# Patient Record
Sex: FEMALE | Race: WHITE | NOT HISPANIC OR LATINO | Employment: PART TIME | URBAN - METROPOLITAN AREA
[De-identification: names, ages, dates, MRNs, and addresses within clinical notes are randomized per-mention and may not be internally consistent; named-entity substitution may affect disease eponyms.]

---

## 2017-01-04 ENCOUNTER — COMMUNICATION - HEALTHEAST (OUTPATIENT)
Dept: RHEUMATOLOGY | Facility: CLINIC | Age: 62
End: 2017-01-04

## 2017-01-16 ENCOUNTER — RECORDS - HEALTHEAST (OUTPATIENT)
Dept: LAB | Facility: CLINIC | Age: 62
End: 2017-01-16

## 2017-01-16 LAB
CHOLEST SERPL-MCNC: 270 MG/DL
FASTING STATUS PATIENT QL REPORTED: YES
HDLC SERPL-MCNC: 80 MG/DL
LDLC SERPL CALC-MCNC: 167 MG/DL
TRIGL SERPL-MCNC: 117 MG/DL

## 2017-03-29 ENCOUNTER — OFFICE VISIT - HEALTHEAST (OUTPATIENT)
Dept: RHEUMATOLOGY | Facility: CLINIC | Age: 62
End: 2017-03-29

## 2017-03-29 DIAGNOSIS — M15.0 PRIMARY OSTEOARTHRITIS INVOLVING MULTIPLE JOINTS: ICD-10-CM

## 2017-03-29 DIAGNOSIS — M25.50 POLYARTHRALGIA: ICD-10-CM

## 2017-04-03 ENCOUNTER — COMMUNICATION - HEALTHEAST (OUTPATIENT)
Dept: ADMINISTRATIVE | Facility: CLINIC | Age: 62
End: 2017-04-03

## 2017-04-03 DIAGNOSIS — M19.90 OA (OSTEOARTHRITIS): ICD-10-CM

## 2017-04-03 DIAGNOSIS — M15.0 PRIMARY OSTEOARTHRITIS INVOLVING MULTIPLE JOINTS: ICD-10-CM

## 2017-04-03 DIAGNOSIS — M25.50 POLYARTHRALGIA: ICD-10-CM

## 2017-06-21 ENCOUNTER — COMMUNICATION - HEALTHEAST (OUTPATIENT)
Dept: RHEUMATOLOGY | Facility: CLINIC | Age: 62
End: 2017-06-21

## 2017-06-21 DIAGNOSIS — M25.50 POLYARTHRALGIA: ICD-10-CM

## 2017-06-21 DIAGNOSIS — M15.0 PRIMARY OSTEOARTHRITIS INVOLVING MULTIPLE JOINTS: ICD-10-CM

## 2017-06-27 ENCOUNTER — COMMUNICATION - HEALTHEAST (OUTPATIENT)
Dept: RHEUMATOLOGY | Facility: CLINIC | Age: 62
End: 2017-06-27

## 2017-06-27 DIAGNOSIS — M25.50 POLYARTHRALGIA: ICD-10-CM

## 2017-06-27 DIAGNOSIS — M15.0 PRIMARY OSTEOARTHRITIS INVOLVING MULTIPLE JOINTS: ICD-10-CM

## 2017-07-05 ENCOUNTER — COMMUNICATION - HEALTHEAST (OUTPATIENT)
Dept: RHEUMATOLOGY | Facility: CLINIC | Age: 62
End: 2017-07-05

## 2017-07-05 DIAGNOSIS — M15.0 PRIMARY OSTEOARTHRITIS INVOLVING MULTIPLE JOINTS: ICD-10-CM

## 2017-07-05 DIAGNOSIS — M25.50 POLYARTHRALGIA: ICD-10-CM

## 2017-07-31 ENCOUNTER — HOSPITAL ENCOUNTER (OUTPATIENT)
Dept: MAMMOGRAPHY | Facility: CLINIC | Age: 62
Discharge: HOME | End: 2017-07-31
Attending: FAMILY MEDICINE

## 2017-07-31 ENCOUNTER — COMMUNICATION - HEALTHEAST (OUTPATIENT)
Dept: TELEHEALTH | Facility: CLINIC | Age: 62
End: 2017-07-31

## 2017-07-31 DIAGNOSIS — Z12.31 VISIT FOR SCREENING MAMMOGRAM: ICD-10-CM

## 2017-09-19 ENCOUNTER — OFFICE VISIT - HEALTHEAST (OUTPATIENT)
Dept: RHEUMATOLOGY | Facility: CLINIC | Age: 62
End: 2017-09-19

## 2017-09-19 DIAGNOSIS — M77.8 LEFT SHOULDER TENDONITIS: ICD-10-CM

## 2017-09-19 DIAGNOSIS — M25.50 PAIN IN JOINT, MULTIPLE SITES: ICD-10-CM

## 2017-09-19 DIAGNOSIS — M15.0 PRIMARY OSTEOARTHRITIS INVOLVING MULTIPLE JOINTS: ICD-10-CM

## 2017-09-19 DIAGNOSIS — M25.512 CHRONIC LEFT SHOULDER PAIN: ICD-10-CM

## 2017-09-19 DIAGNOSIS — G89.29 CHRONIC LEFT SHOULDER PAIN: ICD-10-CM

## 2017-09-19 LAB
ALT SERPL W P-5'-P-CCNC: 22 U/L (ref 0–45)
CREAT SERPL-MCNC: 0.9 MG/DL (ref 0.6–1.1)
GFR SERPL CREATININE-BSD FRML MDRD: >60 ML/MIN/1.73M2

## 2017-12-12 ENCOUNTER — COMMUNICATION - HEALTHEAST (OUTPATIENT)
Dept: RHEUMATOLOGY | Facility: CLINIC | Age: 62
End: 2017-12-12

## 2017-12-12 DIAGNOSIS — M25.50 POLYARTHRALGIA: ICD-10-CM

## 2017-12-12 DIAGNOSIS — M15.0 PRIMARY OSTEOARTHRITIS INVOLVING MULTIPLE JOINTS: ICD-10-CM

## 2018-01-10 ENCOUNTER — COMMUNICATION - HEALTHEAST (OUTPATIENT)
Dept: RHEUMATOLOGY | Facility: CLINIC | Age: 63
End: 2018-01-10

## 2018-01-10 DIAGNOSIS — M15.0 PRIMARY OSTEOARTHRITIS INVOLVING MULTIPLE JOINTS: ICD-10-CM

## 2018-01-10 DIAGNOSIS — M25.50 PAIN IN JOINT, MULTIPLE SITES: ICD-10-CM

## 2018-01-17 ENCOUNTER — RECORDS - HEALTHEAST (OUTPATIENT)
Dept: LAB | Facility: CLINIC | Age: 63
End: 2018-01-17

## 2018-01-18 LAB
ANION GAP SERPL CALCULATED.3IONS-SCNC: 15 MMOL/L (ref 5–18)
BUN SERPL-MCNC: 31 MG/DL (ref 8–22)
CALCIUM SERPL-MCNC: 9.7 MG/DL (ref 8.5–10.5)
CHLORIDE BLD-SCNC: 103 MMOL/L (ref 98–107)
CHOLEST SERPL-MCNC: 246 MG/DL
CO2 SERPL-SCNC: 22 MMOL/L (ref 22–31)
CREAT SERPL-MCNC: 0.84 MG/DL (ref 0.6–1.1)
FASTING STATUS PATIENT QL REPORTED: ABNORMAL
GFR SERPL CREATININE-BSD FRML MDRD: >60 ML/MIN/1.73M2
GLUCOSE BLD-MCNC: 80 MG/DL (ref 70–125)
HDLC SERPL-MCNC: 83 MG/DL
LDLC SERPL CALC-MCNC: 146 MG/DL
POTASSIUM BLD-SCNC: 3.8 MMOL/L (ref 3.5–5)
SODIUM SERPL-SCNC: 140 MMOL/L (ref 136–145)
TRIGL SERPL-MCNC: 83 MG/DL

## 2018-01-19 LAB
HPV SOURCE: ABNORMAL
HUMAN PAPILLOMA VIRUS 16 DNA: NEGATIVE
HUMAN PAPILLOMA VIRUS 18 DNA: NEGATIVE
HUMAN PAPILLOMA VIRUS FINAL DIAGNOSIS: ABNORMAL
HUMAN PAPILLOMA VIRUS OTHER HR: POSITIVE
SPECIMEN DESCRIPTION: ABNORMAL

## 2018-01-24 LAB

## 2018-02-12 ENCOUNTER — COMMUNICATION - HEALTHEAST (OUTPATIENT)
Dept: RHEUMATOLOGY | Facility: CLINIC | Age: 63
End: 2018-02-12

## 2018-02-12 DIAGNOSIS — M25.50 POLYARTHRALGIA: ICD-10-CM

## 2018-02-12 DIAGNOSIS — M15.0 PRIMARY OSTEOARTHRITIS INVOLVING MULTIPLE JOINTS: ICD-10-CM

## 2018-02-19 ENCOUNTER — OFFICE VISIT - HEALTHEAST (OUTPATIENT)
Dept: RHEUMATOLOGY | Facility: CLINIC | Age: 63
End: 2018-02-19

## 2018-02-19 DIAGNOSIS — M79.7 FIBROMYALGIA: ICD-10-CM

## 2018-02-19 DIAGNOSIS — M15.0 PRIMARY OSTEOARTHRITIS INVOLVING MULTIPLE JOINTS: ICD-10-CM

## 2018-02-19 DIAGNOSIS — M15.0 PRIMARY GENERALIZED (OSTEO)ARTHRITIS: ICD-10-CM

## 2018-02-19 RX ORDER — DULOXETIN HYDROCHLORIDE 30 MG/1
30 CAPSULE, DELAYED RELEASE ORAL DAILY
Status: SHIPPED | COMMUNITY
Start: 2018-01-18

## 2018-02-19 ASSESSMENT — MIFFLIN-ST. JEOR: SCORE: 1586.88

## 2018-04-06 ENCOUNTER — COMMUNICATION - HEALTHEAST (OUTPATIENT)
Dept: RHEUMATOLOGY | Facility: CLINIC | Age: 63
End: 2018-04-06

## 2018-04-06 DIAGNOSIS — M15.0 PRIMARY OSTEOARTHRITIS INVOLVING MULTIPLE JOINTS: ICD-10-CM

## 2018-04-06 DIAGNOSIS — M25.50 POLYARTHRALGIA: ICD-10-CM

## 2018-04-11 ENCOUNTER — COMMUNICATION - HEALTHEAST (OUTPATIENT)
Dept: ADMINISTRATIVE | Facility: CLINIC | Age: 63
End: 2018-04-11

## 2018-04-18 ENCOUNTER — COMMUNICATION - HEALTHEAST (OUTPATIENT)
Dept: RHEUMATOLOGY | Facility: CLINIC | Age: 63
End: 2018-04-18

## 2018-04-18 DIAGNOSIS — M15.0 PRIMARY OSTEOARTHRITIS INVOLVING MULTIPLE JOINTS: ICD-10-CM

## 2018-04-18 DIAGNOSIS — M25.50 PAIN IN JOINT, MULTIPLE SITES: ICD-10-CM

## 2018-05-13 ENCOUNTER — COMMUNICATION - HEALTHEAST (OUTPATIENT)
Dept: RHEUMATOLOGY | Facility: CLINIC | Age: 63
End: 2018-05-13

## 2018-05-13 DIAGNOSIS — M25.50 POLYARTHRALGIA: ICD-10-CM

## 2018-05-13 DIAGNOSIS — M15.0 PRIMARY OSTEOARTHRITIS INVOLVING MULTIPLE JOINTS: ICD-10-CM

## 2018-06-07 ENCOUNTER — OFFICE VISIT - HEALTHEAST (OUTPATIENT)
Dept: RHEUMATOLOGY | Facility: CLINIC | Age: 63
End: 2018-06-07

## 2018-06-07 DIAGNOSIS — M79.7 FIBROMYALGIA: ICD-10-CM

## 2018-06-07 DIAGNOSIS — M25.50 PAIN IN JOINT, MULTIPLE SITES: ICD-10-CM

## 2018-07-05 ENCOUNTER — COMMUNICATION - HEALTHEAST (OUTPATIENT)
Dept: RHEUMATOLOGY | Facility: CLINIC | Age: 63
End: 2018-07-05

## 2018-07-05 DIAGNOSIS — M15.0 PRIMARY OSTEOARTHRITIS INVOLVING MULTIPLE JOINTS: ICD-10-CM

## 2018-07-05 DIAGNOSIS — M25.50 POLYARTHRALGIA: ICD-10-CM

## 2018-07-17 ENCOUNTER — COMMUNICATION - HEALTHEAST (OUTPATIENT)
Dept: ADMINISTRATIVE | Facility: CLINIC | Age: 63
End: 2018-07-17

## 2018-10-08 ENCOUNTER — OFFICE VISIT - HEALTHEAST (OUTPATIENT)
Dept: RHEUMATOLOGY | Facility: CLINIC | Age: 63
End: 2018-10-08

## 2018-10-08 DIAGNOSIS — M25.50 PAIN IN JOINT, MULTIPLE SITES: ICD-10-CM

## 2018-10-08 DIAGNOSIS — M25.50 POLYARTHRALGIA: ICD-10-CM

## 2018-10-08 DIAGNOSIS — M15.0 PRIMARY OSTEOARTHRITIS INVOLVING MULTIPLE JOINTS: ICD-10-CM

## 2018-10-08 ASSESSMENT — MIFFLIN-ST. JEOR: SCORE: 1593.24

## 2018-12-28 ENCOUNTER — COMMUNICATION - HEALTHEAST (OUTPATIENT)
Dept: RHEUMATOLOGY | Facility: CLINIC | Age: 63
End: 2018-12-28

## 2018-12-28 DIAGNOSIS — M25.50 POLYARTHRALGIA: ICD-10-CM

## 2018-12-28 DIAGNOSIS — M15.0 PRIMARY OSTEOARTHRITIS INVOLVING MULTIPLE JOINTS: ICD-10-CM

## 2019-01-21 ENCOUNTER — OFFICE VISIT - HEALTHEAST (OUTPATIENT)
Dept: RHEUMATOLOGY | Facility: CLINIC | Age: 64
End: 2019-01-21

## 2019-01-21 ENCOUNTER — RECORDS - HEALTHEAST (OUTPATIENT)
Dept: LAB | Facility: CLINIC | Age: 64
End: 2019-01-21

## 2019-01-21 DIAGNOSIS — M25.50 POLYARTHRALGIA: ICD-10-CM

## 2019-01-21 DIAGNOSIS — M15.0 PRIMARY OSTEOARTHRITIS INVOLVING MULTIPLE JOINTS: ICD-10-CM

## 2019-01-21 DIAGNOSIS — Z79.899 HIGH RISK MEDICATION USE: ICD-10-CM

## 2019-01-21 LAB
ALBUMIN SERPL-MCNC: 3.9 G/DL (ref 3.5–5)
ALP SERPL-CCNC: 55 U/L (ref 45–120)
ALT SERPL W P-5'-P-CCNC: 26 U/L (ref 0–45)
ANION GAP SERPL CALCULATED.3IONS-SCNC: 14 MMOL/L (ref 5–18)
AST SERPL W P-5'-P-CCNC: 30 U/L (ref 0–40)
BILIRUB SERPL-MCNC: 0.6 MG/DL (ref 0–1)
BUN SERPL-MCNC: 22 MG/DL (ref 8–22)
CALCIUM SERPL-MCNC: 9.9 MG/DL (ref 8.5–10.5)
CHLORIDE BLD-SCNC: 102 MMOL/L (ref 98–107)
CO2 SERPL-SCNC: 24 MMOL/L (ref 22–31)
CREAT SERPL-MCNC: 0.82 MG/DL (ref 0.6–1.1)
GFR SERPL CREATININE-BSD FRML MDRD: >60 ML/MIN/1.73M2
GLUCOSE BLD-MCNC: 84 MG/DL (ref 70–125)
POTASSIUM BLD-SCNC: 4 MMOL/L (ref 3.5–5)
PROT SERPL-MCNC: 7.1 G/DL (ref 6–8)
SODIUM SERPL-SCNC: 140 MMOL/L (ref 136–145)

## 2019-01-21 RX ORDER — TRAMADOL HYDROCHLORIDE 50 MG/1
TABLET ORAL
Qty: 180 TABLET | Refills: 0 | Status: SHIPPED | OUTPATIENT
Start: 2019-01-21

## 2019-01-22 LAB
HPV SOURCE: NORMAL
HUMAN PAPILLOMA VIRUS 16 DNA: NEGATIVE
HUMAN PAPILLOMA VIRUS 18 DNA: NEGATIVE
HUMAN PAPILLOMA VIRUS FINAL DIAGNOSIS: NORMAL
HUMAN PAPILLOMA VIRUS OTHER HR: NEGATIVE
SPECIMEN DESCRIPTION: NORMAL

## 2019-01-29 ENCOUNTER — COMMUNICATION - HEALTHEAST (OUTPATIENT)
Dept: RHEUMATOLOGY | Facility: CLINIC | Age: 64
End: 2019-01-29

## 2019-01-29 DIAGNOSIS — M15.0 PRIMARY OSTEOARTHRITIS INVOLVING MULTIPLE JOINTS: ICD-10-CM

## 2019-01-29 DIAGNOSIS — M25.50 PAIN IN JOINT, MULTIPLE SITES: ICD-10-CM

## 2019-01-29 LAB
BKR LAB AP ABNORMAL BLEEDING: NO
BKR LAB AP BIRTH CONTROL/HORMONES: NORMAL
BKR LAB AP CERVICAL APPEARANCE: NORMAL
BKR LAB AP GYN ADEQUACY: NORMAL
BKR LAB AP GYN INTERPRETATION: NORMAL
BKR LAB AP GYN OTHER FINDINGS: NORMAL
BKR LAB AP HPV REFLEX: NORMAL
BKR LAB AP LMP: 2005
BKR LAB AP PATIENT STATUS: NO
BKR LAB AP PREVIOUS ABNORMAL: NORMAL
BKR LAB AP PREVIOUS NORMAL: NORMAL
HIGH RISK?: YES
PATH REPORT.COMMENTS IMP SPEC: NORMAL
RESULT FLAG (HE HISTORICAL CONVERSION): NORMAL

## 2019-03-29 ENCOUNTER — COMMUNICATION - HEALTHEAST (OUTPATIENT)
Dept: RHEUMATOLOGY | Facility: CLINIC | Age: 64
End: 2019-03-29

## 2019-03-29 DIAGNOSIS — M15.0 PRIMARY OSTEOARTHRITIS INVOLVING MULTIPLE JOINTS: ICD-10-CM

## 2019-03-29 DIAGNOSIS — M25.50 POLYARTHRALGIA: ICD-10-CM

## 2019-04-23 ENCOUNTER — COMMUNICATION - HEALTHEAST (OUTPATIENT)
Dept: RHEUMATOLOGY | Facility: CLINIC | Age: 64
End: 2019-04-23

## 2019-04-23 DIAGNOSIS — M25.50 PAIN IN JOINT, MULTIPLE SITES: ICD-10-CM

## 2019-04-23 DIAGNOSIS — M15.0 PRIMARY OSTEOARTHRITIS INVOLVING MULTIPLE JOINTS: ICD-10-CM

## 2019-04-26 ENCOUNTER — COMMUNICATION - HEALTHEAST (OUTPATIENT)
Dept: RHEUMATOLOGY | Facility: CLINIC | Age: 64
End: 2019-04-26

## 2019-04-26 DIAGNOSIS — M15.0 PRIMARY OSTEOARTHRITIS INVOLVING MULTIPLE JOINTS: ICD-10-CM

## 2019-04-26 DIAGNOSIS — M25.50 POLYARTHRALGIA: ICD-10-CM

## 2019-05-16 ENCOUNTER — OFFICE VISIT - HEALTHEAST (OUTPATIENT)
Dept: RHEUMATOLOGY | Facility: CLINIC | Age: 64
End: 2019-05-16

## 2019-05-16 DIAGNOSIS — M79.7 FIBROMYALGIA: ICD-10-CM

## 2019-05-16 DIAGNOSIS — M77.8 LEFT SHOULDER TENDONITIS: ICD-10-CM

## 2019-05-16 DIAGNOSIS — M25.50 PAIN IN JOINT, MULTIPLE SITES: ICD-10-CM

## 2019-05-16 DIAGNOSIS — M15.0 PRIMARY OSTEOARTHRITIS INVOLVING MULTIPLE JOINTS: ICD-10-CM

## 2019-05-16 DIAGNOSIS — Z79.899 HIGH RISK MEDICATION USE: ICD-10-CM

## 2019-07-01 ENCOUNTER — COMMUNICATION - HEALTHEAST (OUTPATIENT)
Dept: RHEUMATOLOGY | Facility: CLINIC | Age: 64
End: 2019-07-01

## 2019-07-01 DIAGNOSIS — M15.0 PRIMARY OSTEOARTHRITIS INVOLVING MULTIPLE JOINTS: ICD-10-CM

## 2019-07-01 DIAGNOSIS — M25.50 POLYARTHRALGIA: ICD-10-CM

## 2019-07-03 ENCOUNTER — HOSPITAL ENCOUNTER (OUTPATIENT)
Dept: MAMMOGRAPHY | Facility: CLINIC | Age: 64
Discharge: HOME | End: 2019-07-03
Attending: FAMILY MEDICINE

## 2019-07-03 DIAGNOSIS — Z12.31 VISIT FOR SCREENING MAMMOGRAM: ICD-10-CM

## 2019-07-05 ENCOUNTER — COMMUNICATION - HEALTHEAST (OUTPATIENT)
Dept: ADMINISTRATIVE | Facility: CLINIC | Age: 64
End: 2019-07-05

## 2019-07-05 DIAGNOSIS — M25.50 POLYARTHRALGIA: ICD-10-CM

## 2019-07-05 DIAGNOSIS — M15.0 PRIMARY OSTEOARTHRITIS INVOLVING MULTIPLE JOINTS: ICD-10-CM

## 2019-07-23 ENCOUNTER — COMMUNICATION - HEALTHEAST (OUTPATIENT)
Dept: RHEUMATOLOGY | Facility: CLINIC | Age: 64
End: 2019-07-23

## 2019-07-23 DIAGNOSIS — M15.0 PRIMARY OSTEOARTHRITIS INVOLVING MULTIPLE JOINTS: ICD-10-CM

## 2019-07-23 DIAGNOSIS — M25.50 PAIN IN JOINT, MULTIPLE SITES: ICD-10-CM

## 2019-09-30 ENCOUNTER — COMMUNICATION - HEALTHEAST (OUTPATIENT)
Dept: RHEUMATOLOGY | Facility: CLINIC | Age: 64
End: 2019-09-30

## 2019-09-30 DIAGNOSIS — M15.0 PRIMARY OSTEOARTHRITIS INVOLVING MULTIPLE JOINTS: ICD-10-CM

## 2019-09-30 DIAGNOSIS — M25.50 POLYARTHRALGIA: ICD-10-CM

## 2019-10-20 ENCOUNTER — COMMUNICATION - HEALTHEAST (OUTPATIENT)
Dept: RHEUMATOLOGY | Facility: CLINIC | Age: 64
End: 2019-10-20

## 2019-10-20 DIAGNOSIS — M25.50 PAIN IN JOINT, MULTIPLE SITES: ICD-10-CM

## 2019-10-20 DIAGNOSIS — M15.0 PRIMARY OSTEOARTHRITIS INVOLVING MULTIPLE JOINTS: ICD-10-CM

## 2019-11-12 ENCOUNTER — COMMUNICATION - HEALTHEAST (OUTPATIENT)
Dept: RHEUMATOLOGY | Facility: CLINIC | Age: 64
End: 2019-11-12

## 2019-11-12 DIAGNOSIS — M25.50 PAIN IN JOINT, MULTIPLE SITES: ICD-10-CM

## 2019-11-12 DIAGNOSIS — M15.0 PRIMARY OSTEOARTHRITIS INVOLVING MULTIPLE JOINTS: ICD-10-CM

## 2019-11-13 ENCOUNTER — OFFICE VISIT - HEALTHEAST (OUTPATIENT)
Dept: RHEUMATOLOGY | Facility: CLINIC | Age: 64
End: 2019-11-13

## 2019-11-13 DIAGNOSIS — M75.82 OTHER SHOULDER LESIONS, LEFT SHOULDER: ICD-10-CM

## 2019-11-13 DIAGNOSIS — M15.0 PRIMARY OSTEOARTHRITIS INVOLVING MULTIPLE JOINTS: ICD-10-CM

## 2019-11-13 DIAGNOSIS — M79.7 FIBROMYALGIA: ICD-10-CM

## 2019-11-13 DIAGNOSIS — M77.8 LEFT SHOULDER TENDONITIS: ICD-10-CM

## 2019-11-13 DIAGNOSIS — G89.29 CHRONIC LEFT SHOULDER PAIN: ICD-10-CM

## 2019-11-13 DIAGNOSIS — M15.0 PRIMARY GENERALIZED (OSTEO)ARTHRITIS: ICD-10-CM

## 2019-11-13 DIAGNOSIS — M25.512 CHRONIC LEFT SHOULDER PAIN: ICD-10-CM

## 2019-11-13 ASSESSMENT — MIFFLIN-ST. JEOR: SCORE: 1555.13

## 2019-11-19 ENCOUNTER — COMMUNICATION - HEALTHEAST (OUTPATIENT)
Dept: RHEUMATOLOGY | Facility: CLINIC | Age: 64
End: 2019-11-19

## 2019-11-19 DIAGNOSIS — R91.1 SOLITARY LUNG NODULE: ICD-10-CM

## 2019-11-19 DIAGNOSIS — M25.50 PAIN IN JOINT, MULTIPLE SITES: ICD-10-CM

## 2019-11-19 DIAGNOSIS — M15.0 PRIMARY OSTEOARTHRITIS INVOLVING MULTIPLE JOINTS: ICD-10-CM

## 2019-11-19 DIAGNOSIS — R91.1 INCIDENTAL LUNG NODULE: ICD-10-CM

## 2019-11-21 ENCOUNTER — HOSPITAL ENCOUNTER (OUTPATIENT)
Dept: CT IMAGING | Facility: CLINIC | Age: 64
Discharge: HOME | End: 2019-11-21
Attending: INTERNAL MEDICINE

## 2019-11-21 ENCOUNTER — COMMUNICATION - HEALTHEAST (OUTPATIENT)
Dept: TELEHEALTH | Facility: CLINIC | Age: 64
End: 2019-11-21

## 2019-11-21 DIAGNOSIS — R91.1 INCIDENTAL LUNG NODULE: ICD-10-CM

## 2019-12-23 ENCOUNTER — COMMUNICATION - HEALTHEAST (OUTPATIENT)
Dept: RHEUMATOLOGY | Facility: CLINIC | Age: 64
End: 2019-12-23

## 2019-12-23 DIAGNOSIS — M25.50 POLYARTHRALGIA: ICD-10-CM

## 2019-12-23 DIAGNOSIS — M15.0 PRIMARY OSTEOARTHRITIS INVOLVING MULTIPLE JOINTS: ICD-10-CM

## 2020-01-16 ENCOUNTER — RECORDS - HEALTHEAST (OUTPATIENT)
Dept: LAB | Facility: CLINIC | Age: 65
End: 2020-01-16

## 2020-01-16 LAB
ANION GAP SERPL CALCULATED.3IONS-SCNC: 12 MMOL/L (ref 5–18)
BUN SERPL-MCNC: 35 MG/DL (ref 8–22)
CALCIUM SERPL-MCNC: 9.9 MG/DL (ref 8.5–10.5)
CHLORIDE BLD-SCNC: 104 MMOL/L (ref 98–107)
CO2 SERPL-SCNC: 26 MMOL/L (ref 22–31)
CREAT SERPL-MCNC: 1.09 MG/DL (ref 0.6–1.1)
GFR SERPL CREATININE-BSD FRML MDRD: 51 ML/MIN/1.73M2
GLUCOSE BLD-MCNC: 87 MG/DL (ref 70–125)
POTASSIUM BLD-SCNC: 4.5 MMOL/L (ref 3.5–5)
SODIUM SERPL-SCNC: 142 MMOL/L (ref 136–145)

## 2020-02-10 ENCOUNTER — RECORDS - HEALTHEAST (OUTPATIENT)
Dept: LAB | Facility: CLINIC | Age: 65
End: 2020-02-10

## 2020-02-10 LAB
ANION GAP SERPL CALCULATED.3IONS-SCNC: 12 MMOL/L (ref 5–18)
BUN SERPL-MCNC: 32 MG/DL (ref 8–22)
CALCIUM SERPL-MCNC: 10.3 MG/DL (ref 8.5–10.5)
CHLORIDE BLD-SCNC: 103 MMOL/L (ref 98–107)
CHOLEST SERPL-MCNC: 306 MG/DL
CO2 SERPL-SCNC: 25 MMOL/L (ref 22–31)
CREAT SERPL-MCNC: 0.96 MG/DL (ref 0.6–1.1)
FASTING STATUS PATIENT QL REPORTED: YES
GFR SERPL CREATININE-BSD FRML MDRD: 59 ML/MIN/1.73M2
GLUCOSE BLD-MCNC: 92 MG/DL (ref 70–125)
HDLC SERPL-MCNC: 92 MG/DL
LDLC SERPL CALC-MCNC: 200 MG/DL
POTASSIUM BLD-SCNC: 4.2 MMOL/L (ref 3.5–5)
SODIUM SERPL-SCNC: 140 MMOL/L (ref 136–145)
TRIGL SERPL-MCNC: 72 MG/DL

## 2020-02-21 ENCOUNTER — RECORDS - HEALTHEAST (OUTPATIENT)
Dept: LAB | Facility: CLINIC | Age: 65
End: 2020-02-21

## 2020-02-21 LAB
ALBUMIN SERPL-MCNC: 4.1 G/DL (ref 3.5–5)
ANION GAP SERPL CALCULATED.3IONS-SCNC: 12 MMOL/L (ref 5–18)
BUN SERPL-MCNC: 32 MG/DL (ref 8–22)
CALCIUM SERPL-MCNC: 10.2 MG/DL (ref 8.5–10.5)
CHLORIDE BLD-SCNC: 100 MMOL/L (ref 98–107)
CO2 SERPL-SCNC: 26 MMOL/L (ref 22–31)
CREAT SERPL-MCNC: 1.19 MG/DL (ref 0.6–1.1)
GFR SERPL CREATININE-BSD FRML MDRD: 46 ML/MIN/1.73M2
GLUCOSE BLD-MCNC: 83 MG/DL (ref 70–125)
PHOSPHATE SERPL-MCNC: 3.2 MG/DL (ref 2.5–4.5)
POTASSIUM BLD-SCNC: 4.5 MMOL/L (ref 3.5–5)
SODIUM SERPL-SCNC: 138 MMOL/L (ref 136–145)

## 2020-03-22 ENCOUNTER — COMMUNICATION - HEALTHEAST (OUTPATIENT)
Dept: RHEUMATOLOGY | Facility: CLINIC | Age: 65
End: 2020-03-22

## 2020-03-22 DIAGNOSIS — M15.0 PRIMARY OSTEOARTHRITIS INVOLVING MULTIPLE JOINTS: ICD-10-CM

## 2020-03-22 DIAGNOSIS — M25.50 POLYARTHRALGIA: ICD-10-CM

## 2020-04-06 ENCOUNTER — RECORDS - HEALTHEAST (OUTPATIENT)
Dept: LAB | Facility: CLINIC | Age: 65
End: 2020-04-06

## 2020-04-06 LAB
ANION GAP SERPL CALCULATED.3IONS-SCNC: 13 MMOL/L (ref 5–18)
BUN SERPL-MCNC: 20 MG/DL (ref 8–22)
CALCIUM SERPL-MCNC: 10.4 MG/DL (ref 8.5–10.5)
CHLORIDE BLD-SCNC: 101 MMOL/L (ref 98–107)
CO2 SERPL-SCNC: 24 MMOL/L (ref 22–31)
CREAT SERPL-MCNC: 1.17 MG/DL (ref 0.6–1.1)
GFR SERPL CREATININE-BSD FRML MDRD: 47 ML/MIN/1.73M2
GLUCOSE BLD-MCNC: 97 MG/DL (ref 70–125)
POTASSIUM BLD-SCNC: 4.6 MMOL/L (ref 3.5–5)
SODIUM SERPL-SCNC: 138 MMOL/L (ref 136–145)

## 2020-04-18 ENCOUNTER — COMMUNICATION - HEALTHEAST (OUTPATIENT)
Dept: RHEUMATOLOGY | Facility: CLINIC | Age: 65
End: 2020-04-18

## 2020-04-18 DIAGNOSIS — M25.50 POLYARTHRALGIA: ICD-10-CM

## 2020-04-18 DIAGNOSIS — M15.0 PRIMARY OSTEOARTHRITIS INVOLVING MULTIPLE JOINTS: ICD-10-CM

## 2020-05-12 ENCOUNTER — OFFICE VISIT - HEALTHEAST (OUTPATIENT)
Dept: RHEUMATOLOGY | Facility: CLINIC | Age: 65
End: 2020-05-12

## 2020-05-12 DIAGNOSIS — M25.50 PAIN IN JOINT, MULTIPLE SITES: ICD-10-CM

## 2020-05-12 DIAGNOSIS — M15.0 PRIMARY OSTEOARTHRITIS INVOLVING MULTIPLE JOINTS: ICD-10-CM

## 2020-05-12 DIAGNOSIS — M79.7 FIBROMYALGIA: ICD-10-CM

## 2020-05-12 DIAGNOSIS — Z79.899 HIGH RISK MEDICATION USE: ICD-10-CM

## 2020-09-10 ENCOUNTER — OFFICE VISIT - HEALTHEAST (OUTPATIENT)
Dept: RHEUMATOLOGY | Facility: CLINIC | Age: 65
End: 2020-09-10

## 2020-09-10 DIAGNOSIS — Z79.899 HIGH RISK MEDICATION USE: ICD-10-CM

## 2020-09-10 DIAGNOSIS — M25.50 PAIN IN JOINT, MULTIPLE SITES: ICD-10-CM

## 2020-09-10 DIAGNOSIS — M79.7 FIBROMYALGIA: ICD-10-CM

## 2020-09-10 DIAGNOSIS — M15.0 PRIMARY OSTEOARTHRITIS INVOLVING MULTIPLE JOINTS: ICD-10-CM

## 2020-10-07 ENCOUNTER — OFFICE VISIT - HEALTHEAST (OUTPATIENT)
Dept: RHEUMATOLOGY | Facility: CLINIC | Age: 65
End: 2020-10-07

## 2020-10-07 DIAGNOSIS — M15.0 PRIMARY OSTEOARTHRITIS INVOLVING MULTIPLE JOINTS: ICD-10-CM

## 2020-10-07 DIAGNOSIS — M77.8 LEFT SHOULDER TENDONITIS: ICD-10-CM

## 2020-10-07 RX ORDER — METRONIDAZOLE 7.5 MG/G
GEL TOPICAL
Status: SHIPPED | COMMUNITY
Start: 2020-08-10

## 2020-10-07 RX ORDER — CANDESARTAN 8 MG/1
TABLET ORAL
Status: SHIPPED | COMMUNITY
Start: 2020-10-05

## 2020-12-03 ENCOUNTER — RECORDS - HEALTHEAST (OUTPATIENT)
Dept: LAB | Facility: CLINIC | Age: 65
End: 2020-12-03

## 2020-12-03 LAB
ANION GAP SERPL CALCULATED.3IONS-SCNC: 14 MMOL/L (ref 5–18)
BUN SERPL-MCNC: 22 MG/DL (ref 8–22)
CALCIUM SERPL-MCNC: 10 MG/DL (ref 8.5–10.5)
CHLORIDE BLD-SCNC: 101 MMOL/L (ref 98–107)
CO2 SERPL-SCNC: 27 MMOL/L (ref 22–31)
CREAT SERPL-MCNC: 0.97 MG/DL (ref 0.6–1.1)
GFR SERPL CREATININE-BSD FRML MDRD: 58 ML/MIN/1.73M2
GLUCOSE BLD-MCNC: 100 MG/DL (ref 70–125)
POTASSIUM BLD-SCNC: 4.5 MMOL/L (ref 3.5–5)
SODIUM SERPL-SCNC: 142 MMOL/L (ref 136–145)

## 2020-12-16 ENCOUNTER — HOSPITAL ENCOUNTER (OUTPATIENT)
Dept: MAMMOGRAPHY | Facility: CLINIC | Age: 65
Discharge: HOME | End: 2020-12-16
Attending: FAMILY MEDICINE

## 2020-12-16 DIAGNOSIS — Z12.31 VISIT FOR SCREENING MAMMOGRAM: ICD-10-CM

## 2021-02-15 ENCOUNTER — RECORDS - HEALTHEAST (OUTPATIENT)
Dept: LAB | Facility: CLINIC | Age: 66
End: 2021-02-15

## 2021-02-15 LAB
ANION GAP SERPL CALCULATED.3IONS-SCNC: 11 MMOL/L (ref 5–18)
BUN SERPL-MCNC: 22 MG/DL (ref 8–22)
CALCIUM SERPL-MCNC: 10 MG/DL (ref 8.5–10.5)
CHLORIDE BLD-SCNC: 102 MMOL/L (ref 98–107)
CHOLEST SERPL-MCNC: 286 MG/DL
CO2 SERPL-SCNC: 29 MMOL/L (ref 22–31)
CREAT SERPL-MCNC: 1.2 MG/DL (ref 0.6–1.1)
FASTING STATUS PATIENT QL REPORTED: YES
GFR SERPL CREATININE-BSD FRML MDRD: 45 ML/MIN/1.73M2
GLUCOSE BLD-MCNC: 99 MG/DL (ref 70–125)
HDLC SERPL-MCNC: 88 MG/DL
LDLC SERPL CALC-MCNC: 177 MG/DL
POTASSIUM BLD-SCNC: 4.2 MMOL/L (ref 3.5–5)
SODIUM SERPL-SCNC: 142 MMOL/L (ref 136–145)
TRIGL SERPL-MCNC: 106 MG/DL

## 2021-02-17 ENCOUNTER — OFFICE VISIT - HEALTHEAST (OUTPATIENT)
Dept: RHEUMATOLOGY | Facility: CLINIC | Age: 66
End: 2021-02-17

## 2021-02-17 DIAGNOSIS — E66.01 MORBID OBESITY (H): ICD-10-CM

## 2021-02-17 DIAGNOSIS — M77.8 LEFT SHOULDER TENDONITIS: ICD-10-CM

## 2021-02-17 DIAGNOSIS — N18.32 STAGE 3B CHRONIC KIDNEY DISEASE (H): ICD-10-CM

## 2021-02-17 ASSESSMENT — MIFFLIN-ST. JEOR: SCORE: 1525.2

## 2021-04-23 ENCOUNTER — COMMUNICATION - HEALTHEAST (OUTPATIENT)
Dept: RHEUMATOLOGY | Facility: CLINIC | Age: 66
End: 2021-04-23

## 2021-04-23 DIAGNOSIS — M15.0 PRIMARY OSTEOARTHRITIS INVOLVING MULTIPLE JOINTS: ICD-10-CM

## 2021-04-23 RX ORDER — GABAPENTIN 100 MG/1
CAPSULE ORAL
Qty: 90 CAPSULE | Refills: 2 | Status: SHIPPED | OUTPATIENT
Start: 2021-04-23

## 2021-05-26 VITALS — SYSTOLIC BLOOD PRESSURE: 130 MMHG | DIASTOLIC BLOOD PRESSURE: 80 MMHG | HEART RATE: 88 BPM

## 2021-05-28 NOTE — PROGRESS NOTES
ASSESSMENT AND PLAN:  Natasha Nieto 63 y.o. 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  She has widespread osteoarthritis fibroma duloxetine nonsteroidals tramadol.  Of these she gets gabapentin from her and the rest from her primary physician.  She has had tendinopathy symptoms in the left shoulder, she is aware of the options and will exercise them the left knee pain associated with osteoarthritis it did respond to corticosteroid injection her previous visit however the relief was relatively short-lived at 1 month.  Nonpharmacologic measures for management of OA were discussed.  She is to follow-up here in the 6 months.           Diagnoses and all orders for this visit:    Primary osteoarthritis involving multiple joints    Left shoulder tendonitis    Arthralgias In Multiple Sites    Fibromyalgia    High risk medication use          HISTORY OF PRESENTING ILLNESS:  Natasha Nieto 63 y.o. is here for follow-up of osteoarthritis, fibromyalgia.  Overall she has felt significantly better, however over the past 6 weeks she is been hurting more, left shoulder, especially with reaching, this has been moderately severely painful, the left knee injection done here in October of last year provided her for about a month or so of relief.  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  She finds it harder to lift heavy boxes, waking up early hours of the morning she feels a sticking install now.  Her primary physician refill the tramadol.  She takes it twice daily.    Overall noted pain level to be mild, able to do most of her day-to-day activities without any or with some difficulty, morning stiffness no more than half an hour to an hour, she has not had fever weight loss blurry vision eye redness she has had no nausea cough or rash.  She gets mouth ulcers with citrus products.  After this appointment she has had it for a physical at her  primary physicians..     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  She noted that her insurance is such that she has to pay significant amount for co-pay.  Further historical information, including ROS and limitation in activities as noted in the multidimensional health assessment questionnaire scanned in the EMR and in the assessment and plan section.    ALLERGIES:Acetaminophen and Tomato    PAST MEDICAL/ACTIVE PROBLEMS/MEDICATION/SOCIAL DATA  No past medical history on file.  Social History     Tobacco Use   Smoking Status Never Smoker   Smokeless Tobacco Never Used     Patient Active Problem List   Diagnosis     Morbid Obesity     Arthralgias In Multiple Sites     Bursitis     Osteoarthritis Of The Knee     Immunology Studies Nonspecific Abnormal Findings     OA (osteoarthritis)     Primary osteoarthritis involving multiple joints     Pes planus of both feet     High risk medication use     Left shoulder tendonitis     Left shoulder pain     Fibromyalgia     Current Outpatient Medications   Medication Sig Dispense Refill     calcium carbonate (OS-ROMAN) 600 mg (1,500 mg) tablet Take by mouth. Take 2 tablet 3 times daily.       cholecalciferol, vitamin D3, 400 unit Tab Take 800 Units by mouth daily.       DULoxetine (CYMBALTA) 30 MG capsule Take 30 mg by mouth 3 (three) times a day.       hydrochlorothiazide (HYDRODIURIL) 25 MG tablet Take 25 mg by mouth daily.       multivitamin (MULTIVITAMIN) per tablet Take 1 tablet by mouth daily.       naproxen (NAPROSYN) 500 MG tablet TAKE 1 TABLET BY MOUTH TWICE A DAY WITH MEALS 180 tablet 0     omega-3 fatty acids-vitamin E (FISH OIL) 1,000 mg cap Take 2 capsules by mouth daily.       omeprazole (PRILOSEC) 20 MG capsule Take 20 mg by mouth daily. Delayed release.       traMADol (ULTRAM) 50 mg tablet TAKE 1 TABLET EVERY 12     HOURS AS NEEDED FOR PAIN 180 tablet 0     UNABLE TO FIND 1 tablet daily. Med  Name: Vitamin B-6 TABS       gabapentin (NEURONTIN) 100 MG capsule Take 100 mg by mouth 2 (two) times a day. 180 capsule 0     No current facility-administered medications for this visit.        DETAILED EXAMINATION  05/16/19  :  Vitals:    05/16/19 1542   BP: 136/80   Patient Site: Right Arm   Patient Position: Sitting   Cuff Size: Adult Large   Pulse: (!) 42   Weight: (!) 244 lb (110.7 kg)     Alert oriented. Head including the face is examined for malar rash, heliotropes, scarring, lupus pernio. Eyes examined for redness such as in episcleritis/scleritis, periorbital lesions.   Neck/ Face examined for parotid gland swelling, range of motion of neck.  Left upper and lower and right upper and lower extremities examined for tenderness, swelling, warmth of the appendicular joints, range of motion, edema, rash.  Some of the important findings included: She does not have evidence of synovitis in any of the palpable joints of the upper extremities.  No significant deformities of the digits. She has  JLT but no effusion or warmth of the knees.  Mild impingement of the left shoulder.        LAB / IMAGING DATA:  ALT   Date Value Ref Range Status   01/21/2019 26 0 - 45 U/L Final   09/19/2017 22 0 - 45 U/L Final   12/21/2016 28 0 - 45 U/L Final     Albumin   Date Value Ref Range Status   01/21/2019 3.9 3.5 - 5.0 g/dL Final   09/19/2017 3.9 3.5 - 5.0 g/dL Final   12/21/2016 3.8 3.5 - 5.0 g/dL Final     Creatinine   Date Value Ref Range Status   01/21/2019 0.82 0.60 - 1.10 mg/dL Final   01/17/2018 0.84 0.60 - 1.10 mg/dL Final   09/19/2017 0.90 0.60 - 1.10 mg/dL Final       WBC   Date Value Ref Range Status   09/19/2017 4.4 4.0 - 11.0 thou/uL Final   12/21/2016 3.7 (L) 4.0 - 11.0 thou/uL Final     Hemoglobin   Date Value Ref Range Status   09/19/2017 13.3 12.0 - 16.0 g/dL Final   12/21/2016 12.8 12.0 - 16.0 g/dL Final   04/08/2013 12.3 12.0 - 16.0 g/dL Final     Platelets   Date Value Ref Range Status   09/19/2017 747 664 - 979  thou/uL Final   12/21/2016 183 140 - 440 thou/uL Final   04/08/2013 204 140 - 440 thou/uL Final       No results found for: HILARY

## 2021-05-30 VITALS — WEIGHT: 245 LBS

## 2021-05-30 NOTE — TELEPHONE ENCOUNTER
rx refill request received from [facility] pharmacy for gabapentin.     rx sent for signature.

## 2021-05-30 NOTE — TELEPHONE ENCOUNTER
Mando    Please refill:   gabapentin (NEURONTIN) 100 MG capsule     States she is taking as: Take 100 mg by mouth 3 times a day    Please send to: [id number] [place] [address]    Any questions or concerns, please call her at : [phone]

## 2021-05-31 VITALS — WEIGHT: 252.8 LBS

## 2021-06-01 VITALS — WEIGHT: 250 LBS | BODY MASS INDEX: 48.82 KG/M2

## 2021-06-01 VITALS — HEIGHT: 60 IN | WEIGHT: 247 LBS | BODY MASS INDEX: 48.49 KG/M2

## 2021-06-02 VITALS — HEIGHT: 60 IN | BODY MASS INDEX: 48.77 KG/M2 | WEIGHT: 248.4 LBS

## 2021-06-02 VITALS — WEIGHT: 245 LBS | BODY MASS INDEX: 47.85 KG/M2

## 2021-06-03 ENCOUNTER — RECORDS - HEALTHEAST (OUTPATIENT)
Dept: ADMINISTRATIVE | Facility: CLINIC | Age: 66
End: 2021-06-03

## 2021-06-03 VITALS — WEIGHT: 244 LBS | BODY MASS INDEX: 47.65 KG/M2

## 2021-06-03 VITALS
HEART RATE: 80 BPM | SYSTOLIC BLOOD PRESSURE: 124 MMHG | HEIGHT: 60 IN | WEIGHT: 240 LBS | DIASTOLIC BLOOD PRESSURE: 70 MMHG | BODY MASS INDEX: 47.12 KG/M2

## 2021-06-03 NOTE — TELEPHONE ENCOUNTER
[facility] calling to report that the pt should xray shows a possible R upper lobe  Lung nodule and f/u is recommended. I will inform the MD.

## 2021-06-03 NOTE — TELEPHONE ENCOUNTER
I called LM for the pt to c/b to inform of the below and inform that Dr Gilmore wants to order a CT chest with and w/out contrast to further evaluate.

## 2021-06-03 NOTE — PROGRESS NOTES
ASSESSMENT AND PLAN:  Natasha Nieto 63 y.o. female is here for follow-up.  She has widespread osteoarthritis, fibromyalgia, left shoulder pain is getting worse, she has features of tendinopathy would like to pursue local injection done after pros and cons were outlined with 40 mg of Kenalog subacromially tolerated the procedure well, check x-ray of the shoulders today.  Continue gabapentin, duloxetine, nonsteroidals.  Follow-up in 6 months.            Diagnoses and all orders for this visit:    Primary osteoarthritis involving multiple joints  -     XR Shoulders Bilateral 2 Or More Views; Future; Expected date: 11/13/2019  -     XR Shoulders Bilateral 2 Or More Views  -     triamcinolone acetonide 40 mg/mL injection 40 mg (KENALOG-40)    Left shoulder tendonitis  -     XR Shoulders Bilateral 2 Or More Views; Future; Expected date: 11/13/2019  -     XR Shoulders Bilateral 2 Or More Views  -     triamcinolone acetonide 40 mg/mL injection 40 mg (KENALOG-40)    Fibromyalgia    Chronic left shoulder pain  -     triamcinolone acetonide 40 mg/mL injection 40 mg (KENALOG-40)          HISTORY OF PRESENTING ILLNESS:  Natasha Nieto 63 y.o. is here for follow-up of osteoarthritis, fibromyalgia.  She has noted pain.  This is in her left shoulder.  This troubles her at night and when she reaches.  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  She continues to hurt.  Reaching is hard.  She uses her right hand for this.  In the past she has had good response to corticosteroid injections.  She continues to work in the hospital.  She is finding her current regimen helpful her overall achiness.    .  Her primary physician refill the tramadol.  She takes it twice daily.    Overall noted pain level to be mild, able to do most of her day-to-day activities without any or with some difficulty, morning stiffness no more than half an hour to  an hour, she has not had fever weight loss blurry vision eye redness she has had no nausea cough or rash.  She gets mouth ulcers with citrus products.  After this appointment she has had it for a physical at her primary physicians..     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  She noted that her insurance is such that she has to pay significant amount for co-pay.  Further historical information, including ROS and limitation in activities as noted in the multidimensional health assessment questionnaire scanned in the EMR and in the assessment and plan section.    ALLERGIES:Acetaminophen and Tomato    PAST MEDICAL/ACTIVE PROBLEMS/MEDICATION/SOCIAL DATA  No past medical history on file.  Social History     Tobacco Use   Smoking Status Never Smoker   Smokeless Tobacco Never Used     Patient Active Problem List   Diagnosis     Morbid Obesity     Arthralgias In Multiple Sites     Bursitis     Osteoarthritis Of The Knee     Immunology Studies Nonspecific Abnormal Findings     OA (osteoarthritis)     Primary osteoarthritis involving multiple joints     Pes planus of both feet     High risk medication use     Left shoulder tendonitis     Left shoulder pain     Fibromyalgia     Current Outpatient Medications   Medication Sig Dispense Refill     calcium carbonate (OS-ROMAN) 600 mg (1,500 mg) tablet Take by mouth. Take 2 tablet 3 times daily.       cholecalciferol, vitamin D3, 400 unit Tab Take 800 Units by mouth daily.       DULoxetine (CYMBALTA) 30 MG capsule Take 30 mg by mouth 3 (three) times a day.       gabapentin (NEURONTIN) 100 MG capsule TAKE 1 CAPSULE BY MOUTH THREE TIMES A  capsule 0     hydrochlorothiazide (HYDRODIURIL) 25 MG tablet Take 25 mg by mouth daily.       multivitamin (MULTIVITAMIN) per tablet Take 1 tablet by mouth daily.       naproxen (NAPROSYN) 500 MG tablet TAKE 1 TABLET BY MOUTH TWICE A DAY WITH MEALS 180 tablet 0      omega-3 fatty acids-vitamin E (FISH OIL) 1,000 mg cap Take 2 capsules by mouth daily.       omeprazole (PRILOSEC) 20 MG capsule Take 20 mg by mouth daily. Delayed release.       traMADol (ULTRAM) 50 mg tablet TAKE 1 TABLET EVERY 12     HOURS AS NEEDED FOR PAIN 180 tablet 0     UNABLE TO FIND 1 tablet daily. Med Name: Vitamin B-6 TABS       No current facility-administered medications for this visit.        DETAILED EXAMINATION  11/13/19  :  Vitals:    11/13/19 0724   BP: 124/70   Patient Site: Right Arm   Patient Position: Sitting   Cuff Size: Adult Large   Pulse: 80   Weight: (!) 240 lb (108.9 kg)   Height: 5' (1.524 m)     Alert oriented. Head including the face is examined for malar rash, heliotropes, scarring, lupus pernio. Eyes examined for redness such as in episcleritis/scleritis, periorbital lesions.   Neck/ Face examined for parotid gland swelling, range of motion of neck.  Left upper and lower and right upper and lower extremities examined for tenderness, swelling, warmth of the appendicular joints, range of motion, edema, rash.  Some of the important findings included: She does not have evidence of synovitis in any of the palpable joints of the upper extremities.  No significant deformities of the digits. She has  JLT but no effusion or warmth of the knees.  She has a painful arc on the left shoulder abduction reproducing her symptoms.      LAB / IMAGING DATA:  ALT   Date Value Ref Range Status   01/21/2019 26 0 - 45 U/L Final   09/19/2017 22 0 - 45 U/L Final   12/21/2016 28 0 - 45 U/L Final     Albumin   Date Value Ref Range Status   01/21/2019 3.9 3.5 - 5.0 g/dL Final   09/19/2017 3.9 3.5 - 5.0 g/dL Final   12/21/2016 3.8 3.5 - 5.0 g/dL Final     Creatinine   Date Value Ref Range Status   01/21/2019 0.82 0.60 - 1.10 mg/dL Final   01/17/2018 0.84 0.60 - 1.10 mg/dL Final   09/19/2017 0.90 0.60 - 1.10 mg/dL Final       WBC   Date Value Ref Range Status   09/19/2017 4.4 4.0 - 11.0 thou/uL Final   12/21/2016  3.7 (L) 4.0 - 11.0 thou/uL Final     Hemoglobin   Date Value Ref Range Status   09/19/2017 13.3 12.0 - 16.0 g/dL Final   12/21/2016 12.8 12.0 - 16.0 g/dL Final   04/08/2013 12.3 12.0 - 16.0 g/dL Final     Platelets   Date Value Ref Range Status   09/19/2017 204 140 - 440 thou/uL Final   12/21/2016 183 140 - 440 thou/uL Final   04/08/2013 204 140 - 440 thou/uL Final       No results found for: HILARY

## 2021-06-05 VITALS
WEIGHT: 233.4 LBS | SYSTOLIC BLOOD PRESSURE: 128 MMHG | DIASTOLIC BLOOD PRESSURE: 72 MMHG | HEART RATE: 74 BPM | BODY MASS INDEX: 45.82 KG/M2 | HEIGHT: 60 IN

## 2021-06-08 NOTE — PROGRESS NOTES
"Natasha Nieto is a 64 y.o. female who is being evaluated via a billable telephone visit.      The patient has been notified of following:     \"This telephone visit will be conducted via a call between you and your physician/provider. We have found that certain health care needs can be provided without the need for a physical exam.  This service lets us provide the care you need with a short phone conversation.  If a prescription is necessary we can send it directly to your pharmacy.  If lab work is needed we can place an order for that and you can then stop by our lab to have the test done at a later time.    Telephone visits are billed at different rates depending on your insurance coverage. During this emergency period, for some insurers they may be billed the same as an in-person visit.  Please reach out to your insurance provider with any questions.    If during the course of the call the physician/provider feels a telephone visit is not appropriate, you will not be charged for this service.\"    Patient has given verbal consent to a Telephone visit? Yes    What phone number would you like to be contacted at? [phone]    Patient would like to receive their AVS by AVS Preference: Hiren. declined       ASSESSMENT AND PLAN:    Diagnoses and all orders for this visit:    Primary osteoarthritis involving multiple joints  -     gabapentin (NEURONTIN) 100 MG capsule; TAKE 1 CAPSULE BY MOUTH THREE TIMES A DAY  Dispense: 90 capsule; Refill: 3    Fibromyalgia    High risk medication use    Arthralgias In Multiple Sites          HISTORY OF PRESENTING ILLNESS:  Natasha Nieto 64 y.o. is evaluated here via phone link.  She has widespread osteoarthritis fibromyalgia, she is currently in stable state, with the weather change he would have more pain such as across her back hips, this is moderately severe, improves with the current regimen control consisting of gabapentin and duloxetine, primary physician increase " tramadol for severe flareup.  She loss consciousness getting out of her work, was taken to [facility] was found to be bradycardiac and has had pacemaker put in.  She is due to have MRI of the brain shortly.  She has noted pain in her shoulders bilaterally.  Worse on the right side, noted that to be 7/10, in the past corticosteroid injections have been helpful.  This sometimes troubles her at night.  We discussed options in the absence of physical therapy and availability of corticosteroid injections at this time exercises were reviewed with her.   ROS enquiry held for fever, ocular symptoms, rash, headache,  GI issues.  We will meet here in 3 months or sooner.  Today we also discussed the issues related to the current pandemic, the pros and cons of the current treatment plan, the CDC guidelines such as social distancing washing the hands covering the cough.  ALLERGIES:Acetaminophen and Tomato    PAST MEDICAL/ACTIVE PROBLEMS/MEDICATION/SOCIAL DATA  No past medical history on file.  Social History     Tobacco Use   Smoking Status Never Smoker   Smokeless Tobacco Never Used     Patient Active Problem List   Diagnosis     Morbid Obesity     Arthralgias In Multiple Sites     Bursitis     Osteoarthritis Of The Knee     Immunology Studies Nonspecific Abnormal Findings     OA (osteoarthritis)     Primary osteoarthritis involving multiple joints     Pes planus of both feet     High risk medication use     Left shoulder tendonitis     Left shoulder pain     Fibromyalgia     Current Outpatient Medications   Medication Sig Dispense Refill     calcium carbonate (OS-ROMAN) 600 mg (1,500 mg) tablet Take by mouth. Take 2 tablet 3 times daily.       cholecalciferol, vitamin D3, 400 unit Tab Take 800 Units by mouth daily.       DULoxetine (CYMBALTA) 30 MG capsule Take 30 mg by mouth 3 (three) times a day.       gabapentin (NEURONTIN) 100 MG capsule TAKE 1 CAPSULE BY MOUTH THREE TIMES A DAY 90 capsule 0     hydrochlorothiazide  (HYDRODIURIL) 25 MG tablet Take 25 mg by mouth daily.       multivitamin (MULTIVITAMIN) per tablet Take 1 tablet by mouth daily.       naproxen (NAPROSYN) 500 MG tablet TAKE 1 TABLET BY MOUTH TWICE A DAY WITH MEALS 180 tablet 0     omega-3 fatty acids-vitamin E (FISH OIL) 1,000 mg cap Take 2 capsules by mouth daily.       omeprazole (PRILOSEC) 20 MG capsule Take 20 mg by mouth daily. Delayed release.       traMADol (ULTRAM) 50 mg tablet TAKE 1 TABLET EVERY 12     HOURS AS NEEDED FOR PAIN 180 tablet 0     UNABLE TO FIND 1 tablet daily. Med Name: Vitamin B-6 TABS       No current facility-administered medications for this visit.          EXAMINATION: Phone visit    LAB / IMAGING DATA:  ALT   Date Value Ref Range Status   01/21/2019 26 0 - 45 U/L Final   09/19/2017 22 0 - 45 U/L Final   12/21/2016 28 0 - 45 U/L Final     Albumin   Date Value Ref Range Status   02/21/2020 4.1 3.5 - 5.0 g/dL Final   01/21/2019 3.9 3.5 - 5.0 g/dL Final   09/19/2017 3.9 3.5 - 5.0 g/dL Final     Creatinine   Date Value Ref Range Status   04/06/2020 1.17 (H) 0.60 - 1.10 mg/dL Final   02/21/2020 1.19 (H) 0.60 - 1.10 mg/dL Final   02/10/2020 0.96 0.60 - 1.10 mg/dL Final       WBC   Date Value Ref Range Status   09/19/2017 4.4 4.0 - 11.0 thou/uL Final   12/21/2016 3.7 (L) 4.0 - 11.0 thou/uL Final     Hemoglobin   Date Value Ref Range Status   09/19/2017 13.3 12.0 - 16.0 g/dL Final   12/21/2016 12.8 12.0 - 16.0 g/dL Final   04/08/2013 12.3 12.0 - 16.0 g/dL Final     Platelets   Date Value Ref Range Status   09/19/2017 204 140 - 440 thou/uL Final   12/21/2016 183 140 - 440 thou/uL Final   04/08/2013 204 140 - 440 thou/uL Final       No results found for: HILARY, RF, SEDRATE  Duration of the call:8  Minutes  Call start: 434  pm  Call end:   442pm

## 2021-06-09 NOTE — PROGRESS NOTES
ASSESSMENT AND PLAN:  Natasha Nieto 61 y.o. female is here for follow-up of polyarthralgias in association with primary osteoarthritis which is widespread, background of fibromyalgia, has found the combination of gabapentin and tramadol to be helpful enough that she would like to continue this.  On her previous visit tramadol dose is dropped to twice daily.  She seems to have manage that.  The weather change recently cause more symptoms but is recovering from that change.  I have asked her to return for follow-up here in 6 months or sooner if needed.   Diagnoses and all orders for this visit:    Primary osteoarthritis involving multiple joints  -     gabapentin (NEURONTIN) 100 MG capsule; Take 100 mg by mouth 3 (three) times a day.  Dispense: 270 capsule; Refill: 0  -     traMADol (ULTRAM) 50 mg tablet; TAKE 1 TABLET (50 MG TOTAL) BY MOUTH EVERY  12 HOURS AS NEEDED FOR PAIN.  Dispense: 180 tablet; Refill: 0    Polyarthralgia  -     gabapentin (NEURONTIN) 100 MG capsule; Take 100 mg by mouth 3 (three) times a day.  Dispense: 270 capsule; Refill: 0  -     traMADol (ULTRAM) 50 mg tablet; TAKE 1 TABLET (50 MG TOTAL) BY MOUTH EVERY  12 HOURS AS NEEDED FOR PAIN.  Dispense: 180 tablet; Refill: 0    Osteoarthritis Of The Knee  -     gabapentin (NEURONTIN) 100 MG capsule; Take 100 mg by mouth 3 (three) times a day.  Dispense: 270 capsule; Refill: 0  -     traMADol (ULTRAM) 50 mg tablet; TAKE 1 TABLET (50 MG TOTAL) BY MOUTH EVERY  12 HOURS AS NEEDED FOR PAIN.  Dispense: 180 tablet; Refill: 0        HISTORY OF PRESENTING ILLNESS:  Natasha Nieto 61 y.o. is here for follow-up of osteoarthritis, fibromyalgia.  the recent weather changes caused her significant discomfort but otherwise she seems to be managing well.   Joints affected include multiple joints. This has gone on for several years ago. Pain is described as aching. It is when active.  Her symptoms are intermittent. The symptoms are gradually worsening. Symptoms  include pain.  Treatment to date has been with significant relief.    She had noted that her pain was under good control with the current quadruple combination of gabapentin, duloxetine, tramadol, naproxen.  In the past 2 weeks is seen worsening pain especially in the PIPs.  Morning stiffness is negligible if any.  The more she uses her hands the worse her pain gets.  She also noted pain in her neck between the scapula worse with neck movement no radiation down the arm.  She has noted no rash in the region..  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  She noted that her insurance is such that she has to pay significant amount for co-pay.  Further historical information, including ROS and limitation in activities as noted in the multidimensional health assessment questionnaire scanned in the EMR and in the assessment and plan section.    ALLERGIES:Acetaminophen    PAST MEDICAL/ACTIVE PROBLEMS/MEDICATION/SOCIAL DATA  No past medical history on file.  History   Smoking Status     Never Smoker   Smokeless Tobacco     Not on file     Patient Active Problem List   Diagnosis     Morbid Obesity     Arthralgias In Multiple Sites     Bursitis     Osteoarthritis Of The Knee     Fibromyalgia     Immunology Studies Nonspecific Abnormal Findings     OA (osteoarthritis)     Primary osteoarthritis involving multiple joints     Pes planus of both feet     High risk medication use     Current Outpatient Prescriptions   Medication Sig Dispense Refill     calcium carbonate (OS-ROMAN) 600 mg (1,500 mg) tablet Take by mouth. Take 2 tablet 3 times daily.       cholecalciferol, vitamin D3, 400 unit Tab Take 800 Units by mouth daily.       DULoxetine (CYMBALTA) 60 MG capsule Take 60 mg by mouth bedtime.       hydrochlorothiazide (HYDRODIURIL) 25 MG tablet Take 25 mg by mouth daily.       multivitamin (MULTIVITAMIN) per tablet Take 1 tablet by mouth daily.       naproxen  (NAPROSYN) 500 MG tablet TAKE 1 TABLET (500 MG TOTAL) BY MOUTH 2 (TWO) TIMES A DAY WITH MEALS. 180 tablet 0     omega-3 fatty acids-vitamin E (FISH OIL) 1,000 mg cap Take 2 capsules by mouth daily.       omeprazole (PRILOSEC) 20 MG capsule Take 20 mg by mouth daily. Delayed release.       traMADol (ULTRAM) 50 mg tablet TAKE 1 TABLET (50 MG TOTAL) BY MOUTH EVERY  12 HOURS AS NEEDED FOR PAIN. 180 tablet 0     UNABLE TO FIND 1 tablet daily. Med Name: Vitamin B-6 TABS       gabapentin (NEURONTIN) 100 MG capsule Take 100 mg by mouth 3 (three) times a day. 270 capsule 0     No current facility-administered medications for this visit.          DETAILED EXAMINATION:  DETAILED EXAMINATION (six area) :  Vitals:    03/29/17 1547   BP: 138/52   Weight: (!) 245 lb (111.1 kg)     Alert oriented. Head including the face is examined for malar rash, heliotropes, scarring, lupus pernio. Eyes examined for redness such as in episcleritis/scleritis, periorbital lesions.   Neck examined  for lymph nodes, range of motion Both upper and lower extremities (all four) examined for swollen, warm &/or  tender joints, range of motion, rash, muscle weakness, edema. The salient normal / abnormal findings are appended.     She has tenderness in the PIPs, JLT of both the knees. She does not have synovitis in any of the palpable appendicular joints.  She is marked pes planus.  This is bilateral.    LAB / IMAGING DATA:  ALT   Date Value Ref Range Status   12/21/2016 28 0 - 45 U/L Final   12/31/2015 29 0 - 45 U/L Final   04/08/2013 30 12 - 78 U/L Final     Comment:     New ALT test method with new reference range as of 6/4/12     Albumin   Date Value Ref Range Status   12/21/2016 3.8 3.5 - 5.0 g/dL Final   12/31/2015 4.0 3.5 - 5.0 g/dL Final   04/08/2013 4.3 3.4 - 5.0 g/dL Final     Creatinine   Date Value Ref Range Status   12/21/2016 0.72 0.60 - 1.10 mg/dL Final   12/31/2015 0.73 0.60 - 1.10 mg/dL Final   12/26/2014 0.80 0.60 - 1.10 mg/dL Final        WBC   Date Value Ref Range Status   12/21/2016 3.7 (L) 4.0 - 11.0 thou/uL Final   04/08/2013 4.1 4.0 - 11.0 thou/uL Final     Hemoglobin   Date Value Ref Range Status   12/21/2016 12.8 12.0 - 16.0 g/dL Final   04/08/2013 12.3 12.0 - 16.0 g/dL Final     Platelets   Date Value Ref Range Status   12/21/2016 183 140 - 440 thou/uL Final   04/08/2013 204 140 - 440 thou/uL Final       No results found for: HILARY

## 2021-06-11 NOTE — PROGRESS NOTES
"Pt states she's in [facility] while taking this call.   Natasha Nieto is a 64 y.o. female who is being evaluated via a billable telephone visit.      The patient has been notified of following:     \"This telephone visit will be conducted via a call between you and your physician/provider. We have found that certain health care needs can be provided without the need for a physical exam.  This service lets us provide the care you need with a short phone conversation.  If a prescription is necessary we can send it directly to your pharmacy.  If lab work is needed we can place an order for that and you can then stop by our lab to have the test done at a later time.    Telephone visits are billed at different rates depending on your insurance coverage. During this emergency period, for some insurers they may be billed the same as an in-person visit.  Please reach out to your insurance provider with any questions.    If during the course of the call the physician/provider feels a telephone visit is not appropriate, you will not be charged for this service.\"    Patient has given verbal consent to a Telephone visit? Yes    What phone number would you like to be contacted at? [phone]    Patient would like to receive their AVS by AVS Preference: Hiren.      ASSESSMENT AND PLAN:    Diagnoses and all orders for this visit:    Primary osteoarthritis involving multiple joints  -     gabapentin (NEURONTIN) 100 MG capsule; TAKE 1 CAPSULE BY MOUTH THREE TIMES A DAY  Dispense: 90 capsule; Refill: 3    Arthralgias In Multiple Sites    High risk medication use    Fibromyalgia          HISTORY OF PRESENTING ILLNESS:  Natasha Nieto 64 y.o. is evaluated here via phone link.  This is for follow-up.  She has widespread osteoarthritis, affecting her axial, appendicular system.  She is on gabapentin, she had vomiting on her medication, tramadol as well.  She is certified that she is in [place].  She noted that a recent " "CT scan of the head and neck after she had lost consciousness which ultimately was due to bradycardia and she had pacemaker put in, showed \"a lot of arthritis\" in her neck.  This is been discussed in the past.  We reviewed the management of OA.  She is complaining of increasing pain in her shoulders in the past corticosteroid injections have been helpful.  We can arrange for those.  She is going to come in for those in the next 2 to 3 weeks.  Meanwhile she is going to continue current regimen from her primary physician.   es were reviewed with her.  ROS enquiry held for fever, ocular symptoms, rash, headache,  GI issues.  Today we also discussed the issues related to the current pandemic, the pros and cons of the current treatment plan, the CDC guidelines such as social distancing washing the hands covering the cough.  ALLERGIES:Acetaminophen and Tomato    PAST MEDICAL/ACTIVE PROBLEMS/MEDICATION/SOCIAL DATA  No past medical history on file.  Social History     Tobacco Use   Smoking Status Never Smoker   Smokeless Tobacco Never Used     Patient Active Problem List   Diagnosis     Morbid Obesity     Arthralgias In Multiple Sites     Bursitis     Osteoarthritis Of The Knee     Immunology Studies Nonspecific Abnormal Findings     OA (osteoarthritis)     Primary osteoarthritis involving multiple joints     Pes planus of both feet     High risk medication use     Left shoulder tendonitis     Left shoulder pain     Fibromyalgia     Current Outpatient Medications   Medication Sig Dispense Refill     calcium carbonate (OS-ROMAN) 600 mg (1,500 mg) tablet Take by mouth. Take 2 tablet 3 times daily.       cholecalciferol, vitamin D3, 400 unit Tab Take 800 Units by mouth daily.       DULoxetine (CYMBALTA) 30 MG capsule Take 30 mg by mouth daily.        gabapentin (NEURONTIN) 100 MG capsule TAKE 1 CAPSULE BY MOUTH THREE TIMES A DAY 90 capsule 3     hydrochlorothiazide (HYDRODIURIL) 25 MG tablet Take 25 mg by mouth daily.       " multivitamin (MULTIVITAMIN) per tablet Take 1 tablet by mouth daily.       naproxen (NAPROSYN) 500 MG tablet TAKE 1 TABLET BY MOUTH TWICE A DAY WITH MEALS 180 tablet 0     omega-3 fatty acids-vitamin E (FISH OIL) 1,000 mg cap Take 2 capsules by mouth daily.       omeprazole (PRILOSEC) 20 MG capsule Take 20 mg by mouth daily. Delayed release.       traMADol (ULTRAM) 50 mg tablet TAKE 1 TABLET EVERY 12     HOURS AS NEEDED FOR PAIN 180 tablet 0     UNABLE TO FIND 1 tablet daily. Med Name: Vitamin B-6 TABS       No current facility-administered medications for this visit.          EXAMINATION:   She sounded comfortable alert, oriented, speech was fluent, she did not sound like she was in pain.  LAB / IMAGING DATA:  ALT   Date Value Ref Range Status   01/21/2019 26 0 - 45 U/L Final   09/19/2017 22 0 - 45 U/L Final   12/21/2016 28 0 - 45 U/L Final     Albumin   Date Value Ref Range Status   02/21/2020 4.1 3.5 - 5.0 g/dL Final   01/21/2019 3.9 3.5 - 5.0 g/dL Final   09/19/2017 3.9 3.5 - 5.0 g/dL Final     Creatinine   Date Value Ref Range Status   04/06/2020 1.17 (H) 0.60 - 1.10 mg/dL Final   02/21/2020 1.19 (H) 0.60 - 1.10 mg/dL Final   02/10/2020 0.96 0.60 - 1.10 mg/dL Final       WBC   Date Value Ref Range Status   09/19/2017 4.4 4.0 - 11.0 thou/uL Final   12/21/2016 3.7 (L) 4.0 - 11.0 thou/uL Final     Hemoglobin   Date Value Ref Range Status   09/19/2017 13.3 12.0 - 16.0 g/dL Final   12/21/2016 12.8 12.0 - 16.0 g/dL Final   04/08/2013 12.3 12.0 - 16.0 g/dL Final     Platelets   Date Value Ref Range Status   09/19/2017 204 140 - 440 thou/uL Final   12/21/2016 183 140 - 440 thou/uL Final   04/08/2013 204 140 - 440 thou/uL Final       No results found for: HILARY, RF, SEDRATE  Duration of the call:7  Minutes  Call start: 1123  am  Call end:   1130 am

## 2021-06-12 NOTE — PROGRESS NOTES
ASSESSMENT AND PLAN:  Natasha Nieto 64 y.o. female is seen here on 10/07/20 for evaluation of left shoulder pain, consistent.  Tendinopathy rotator cuff, with  Osteoarthritis, elected for corticosteroid injection,  done as noted.  Diagnoses and all orders for this visit:    Left shoulder tendonitis  -     triamcinolone acetonide 40 mg/mL injection 40 mg (KENALOG-40)    Primary osteoarthritis involving multiple joints  -     triamcinolone acetonide 40 mg/mL injection 40 mg (KENALOG-40)          HISTORY OF PRESENTING ILLNESS ON 10/07/20 :  Natasha Nieto 64 y.o. is here for a moderately severe flare up of pain. Here for a moderately severe flare up of pain.  Joints affected include the left shoulder(s). This has gone on for several weeks. Pain is described as sharp. It is worse with activity at times bedtime..  Her symptoms are moderately severe. The symptoms are progressive.  Associated findings include /do not include: swelling, rash.  There is no associated recent fall or trauma.  Over-the-counter treatment to date has been without significant relief.    Further historical information, including ROS and limitation in activities as noted in the multidimensional health assessment questionnaire scanned in the EMR and in the assessment and plan section.    ALLERGIES:Acetaminophen and Tomato    PAST MEDICAL/ACTIVE PROBLEMS/MEDICATION/SOCIAL DATA  History reviewed. No pertinent past medical history.  Social History     Tobacco Use   Smoking Status Never Smoker   Smokeless Tobacco Never Used     Patient Active Problem List   Diagnosis     Morbid Obesity     Arthralgias In Multiple Sites     Bursitis     Osteoarthritis Of The Knee     Immunology Studies Nonspecific Abnormal Findings     OA (osteoarthritis)     Primary osteoarthritis involving multiple joints     Pes planus of both feet     High risk medication use     Left shoulder tendonitis     Left shoulder pain     Fibromyalgia     Current Outpatient  Medications   Medication Sig Dispense Refill     calcium carbonate (OS-ROMAN) 600 mg (1,500 mg) tablet Take by mouth. Take 2 tablet 3 times daily.       candesartan (ATACAND) 8 MG tablet        cholecalciferol, vitamin D3, 400 unit Tab Take 800 Units by mouth daily.       DULoxetine (CYMBALTA) 30 MG capsule Take 30 mg by mouth daily.        gabapentin (NEURONTIN) 100 MG capsule TAKE 1 CAPSULE BY MOUTH THREE TIMES A DAY 90 capsule 3     hydrochlorothiazide (HYDRODIURIL) 25 MG tablet Take 25 mg by mouth daily.       metroNIDAZOLE (METROGEL) 0.75 % gel APPLY TO AFFECTED AREA TWICE A DAY       multivitamin (MULTIVITAMIN) per tablet Take 1 tablet by mouth daily.       naproxen (NAPROSYN) 500 MG tablet TAKE 1 TABLET BY MOUTH TWICE A DAY WITH MEALS 180 tablet 0     omega-3 fatty acids-vitamin E (FISH OIL) 1,000 mg cap Take 2 capsules by mouth daily.       omeprazole (PRILOSEC) 20 MG capsule Take 20 mg by mouth daily. Delayed release.       traMADol (ULTRAM) 50 mg tablet TAKE 1 TABLET EVERY 12     HOURS AS NEEDED FOR PAIN 180 tablet 0     UNABLE TO FIND 1 tablet daily. Med Name: Vitamin B-6 TABS       No current facility-administered medications for this visit.          DETAILED EXAMINATION  10/07/20  :  Vitals:    10/07/20 1229   BP: 130/80   Patient Site: Right Arm   Patient Position: Sitting   Cuff Size: Adult Regular   Pulse: 88     Alert oriented. Head including the face is examined for malar rash, heliotropes, scarring, lupus pernio. Eyes examined for redness such as in episcleritis/scleritis, periorbital lesions.   Neck/ Face examined for parotid gland swelling, range of motion of neck.  Left upper and lower and right upper and lower extremities examined for tenderness, swelling, warmth of the appendicular joints, range of motion, edema, rash.  Some of the important findings included: Impingement of the right shoulder in abduction, painful.           LAB / IMAGING DATA:  ALT   Date Value Ref Range Status   01/21/2019  26 0 - 45 U/L Final   09/19/2017 22 0 - 45 U/L Final   12/21/2016 28 0 - 45 U/L Final     Albumin   Date Value Ref Range Status   02/21/2020 4.1 3.5 - 5.0 g/dL Final   01/21/2019 3.9 3.5 - 5.0 g/dL Final   09/19/2017 3.9 3.5 - 5.0 g/dL Final     Creatinine   Date Value Ref Range Status   04/06/2020 1.17 (H) 0.60 - 1.10 mg/dL Final   02/21/2020 1.19 (H) 0.60 - 1.10 mg/dL Final   02/10/2020 0.96 0.60 - 1.10 mg/dL Final       WBC   Date Value Ref Range Status   09/19/2017 4.4 4.0 - 11.0 thou/uL Final   12/21/2016 3.7 (L) 4.0 - 11.0 thou/uL Final     Hemoglobin   Date Value Ref Range Status   09/19/2017 13.3 12.0 - 16.0 g/dL Final   12/21/2016 12.8 12.0 - 16.0 g/dL Final   04/08/2013 12.3 12.0 - 16.0 g/dL Final     Platelets   Date Value Ref Range Status   09/19/2017 204 140 - 440 thou/uL Final   12/21/2016 183 140 - 440 thou/uL Final   04/08/2013 204 140 - 440 thou/uL Final       No results found for: HILARY, RF, SEDRATE

## 2021-06-13 NOTE — PROGRESS NOTES
ASSESSMENT AND PLAN:  Natasha Nieto 61 y.o. female is here for evaluation of pain in her neck and shoulder area.  We talked about how these 2 areas can overlap in terms of the symptoms.  On balance it did appear that the more likely etiology of her symptoms is tendinopathy of the left shoulder.  She would like to try local injection 40 mg of methylprednisolone injected the left subacromial space.  Done with aseptic technique, with Marcaine, she had a prompt Marcaine effect..  Follow-up of polyarthralgias in association with primary osteoarthritis which is widespread, background of fibromyalgia, has found the combination of gabapentin and tramadol to be helpful enough that she would like to continue this.  On her previous visit tramadol dose is dropped to twice daily.  She seems to have manage that.  The weather change recently cause more symptoms but is recovering from that change.  I have asked her to return for follow-up here in 6 months or sooner if needed.   Diagnoses and all orders for this visit:    Arthralgias In Multiple Sites  -     naproxen (NAPROSYN) 500 MG tablet; TAKE 1 TABLET (500 MG TOTAL) BY MOUTH 2 (TWO) TIMES A DAY WITH MEALS.  Dispense: 180 tablet; Refill: 0  -     Creatinine  -     HM2(CBC w/o Differential)  -     ALT (SGPT)  -     Albumin    Primary osteoarthritis involving multiple joints  -     naproxen (NAPROSYN) 500 MG tablet; TAKE 1 TABLET (500 MG TOTAL) BY MOUTH 2 (TWO) TIMES A DAY WITH MEALS.  Dispense: 180 tablet; Refill: 0    Left shoulder tendonitis  -     methylPREDNISolone acetate injection 40 mg (DEPO-MEDROL); Inject 1 mL (40 mg total) into the joint once.    Chronic left shoulder pain  -     methylPREDNISolone acetate injection 40 mg (DEPO-MEDROL); Inject 1 mL (40 mg total) into the joint once.          HISTORY OF PRESENTING ILLNESS:  Natasha Nieto 61 y.o. is here for follow-up of osteoarthritis, fibromyalgia.  She is complaining of increasing pain.  This is in the left  shoulder/neck area.  This is gone on for the past several months.  This is worse with weather change.  She has difficulty lifting her arm by her side also waking up from sleep at nighttime.  The pain radiates down the mid arm.  This is moderately severe to severe.  The recent weather changes caused her significant discomfort but otherwise she seems to be managing well.   Joints affected include multiple joints. This has gone on for several years ago. Pain is described as aching. It is when active.  Her symptoms are intermittent. The symptoms are gradually worsening. Symptoms include pain.  Treatment to date has been with significant relief.    She had noted that her pain was under good control with the current quadruple combination of gabapentin, duloxetine, tramadol, naproxen.  In the past 2 weeks is seen worsening pain especially in the PIPs.  Morning stiffness is negligible if any.    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  She noted that her insurance is such that she has to pay significant amount for co-pay.  Further historical information, including ROS and limitation in activities as noted in the multidimensional health assessment questionnaire scanned in the EMR and in the assessment and plan section.    ALLERGIES:Acetaminophen    PAST MEDICAL/ACTIVE PROBLEMS/MEDICATION/SOCIAL DATA  No past medical history on file.  History   Smoking Status     Never Smoker   Smokeless Tobacco     Not on file     Patient Active Problem List   Diagnosis     Morbid Obesity     Arthralgias In Multiple Sites     Bursitis     Osteoarthritis Of The Knee     Fibromyalgia     Immunology Studies Nonspecific Abnormal Findings     OA (osteoarthritis)     Primary osteoarthritis involving multiple joints     Pes planus of both feet     High risk medication use     Current Outpatient Prescriptions   Medication Sig Dispense Refill     calcium carbonate (OS-ROMAN) 600 mg  (1,500 mg) tablet Take by mouth. Take 2 tablet 3 times daily.       cholecalciferol, vitamin D3, 400 unit Tab Take 800 Units by mouth daily.       DULoxetine (CYMBALTA) 60 MG capsule Take 60 mg by mouth bedtime.       gabapentin (NEURONTIN) 100 MG capsule Take 100 mg by mouth 3 (three) times a day. 270 capsule 0     hydrochlorothiazide (HYDRODIURIL) 25 MG tablet Take 25 mg by mouth daily.       multivitamin (MULTIVITAMIN) per tablet Take 1 tablet by mouth daily.       naproxen (NAPROSYN) 500 MG tablet TAKE 1 TABLET (500 MG TOTAL) BY MOUTH 2 (TWO) TIMES A DAY WITH MEALS. 180 tablet 0     omega-3 fatty acids-vitamin E (FISH OIL) 1,000 mg cap Take 2 capsules by mouth daily.       omeprazole (PRILOSEC) 20 MG capsule Take 20 mg by mouth daily. Delayed release.       traMADol (ULTRAM) 50 mg tablet TAKE 1 TABLET (50 MG TOTAL) BY MOUTH EVERY  12 HOURS AS NEEDED FOR PAIN. 180 tablet 0     UNABLE TO FIND 1 tablet daily. Med Name: Vitamin B-6 TABS       No current facility-administered medications for this visit.          DETAILED EXAMINATION:  DETAILED EXAMINATION (six area) :  Vitals:    09/19/17 1547   BP: 152/58   Patient Site: Right Arm   Patient Position: Sitting   Cuff Size: Adult Large   Pulse: 64   Weight: (!) 252 lb 12.8 oz (114.7 kg)     Alert oriented. Head including the face is examined for malar rash, heliotropes, scarring, lupus pernio. Eyes examined for redness such as in episcleritis/scleritis, periorbital lesions.   Neck examined  for lymph nodes, range of motion Both upper and lower extremities (all four) examined for swollen, warm &/or  tender joints, range of motion, rash, muscle weakness, edema. The salient normal / abnormal findings are appended.  She has marked impingement of her left shoulder only able to abduct to about 100 , internal rotation is limited.  She has remarkably intact range of motion in her C-spine.    She has tenderness in the PIPs, JLT of both the knees. She does not have synovitis in  any of the palpable appendicular joints.   LAB / IMAGING DATA:  ALT   Date Value Ref Range Status   12/21/2016 28 0 - 45 U/L Final   12/31/2015 29 0 - 45 U/L Final   04/08/2013 30 12 - 78 U/L Final     Comment:     New ALT test method with new reference range as of 6/4/12     Albumin   Date Value Ref Range Status   12/21/2016 3.8 3.5 - 5.0 g/dL Final   12/31/2015 4.0 3.5 - 5.0 g/dL Final   04/08/2013 4.3 3.4 - 5.0 g/dL Final     Creatinine   Date Value Ref Range Status   12/21/2016 0.72 0.60 - 1.10 mg/dL Final   12/31/2015 0.73 0.60 - 1.10 mg/dL Final   12/26/2014 0.80 0.60 - 1.10 mg/dL Final       WBC   Date Value Ref Range Status   12/21/2016 3.7 (L) 4.0 - 11.0 thou/uL Final   04/08/2013 4.1 4.0 - 11.0 thou/uL Final     Hemoglobin   Date Value Ref Range Status   12/21/2016 12.8 12.0 - 16.0 g/dL Final   04/08/2013 12.3 12.0 - 16.0 g/dL Final     Platelets   Date Value Ref Range Status   12/21/2016 183 140 - 440 thou/uL Final   04/08/2013 204 140 - 440 thou/uL Final       No results found for: HILARY

## 2021-06-15 NOTE — PROGRESS NOTES
This document was created using a software with less than 100% fidelity, at times resulting in unintended, even erroneous syntax and grammar.  The reader is advised to keep this under consideration while reviewing, interpreting this note.      ASSESSMENT AND PLAN:  Natasha Nieto 65 y.o. female is seen here on 02/17/21 for evaluation of painful joints, worse in the left knee and shoulder, she has osteoarthritis, likely tendinopathy of the left shoulder, she is on duloxetine, gabapentin, cannot take nonsteroidals because of her renal impairment, would like to try corticosteroid injection, 40 mg of Kenalog subacromially injected on the left side, 40 mg of Kenalog into the left knee under ultrasound guidance, after pros and cons were reviewed.  Follow-up in 3 to 4 months.  Diagnoses and all orders for this visit:    Left shoulder tendonitis  -     triamcinolone acetonide 40 mg/mL injection 40 mg (KENALOG-40)    Osteoarthritis Of The Knee  -     triamcinolone acetonide 40 mg/mL injection 40 mg (KENALOG-40)    Stage 3b chronic kidney disease    Morbid obesity (H)          HISTORY OF PRESENTING ILLNESS ON 02/17/21 :  Natasha Nieto 65 y.o. is here for a moderately severe flare up of pain. Here for a moderately severe flare up of pain.  Joints affected include multiple joints, the left shoulder(s) and the left knee(s). This has gone on for several weeks.  The pain is bothersome for example in the left shoulder when she reaches for cabinets at work, she sleeps on her belly so is not woken up from sleep because of the shoulder pain.  She has felt pain in the left knee to be bothersome especially on the back of the knee, she hears creaking, the right knee in the past has been injected is not troublesome.  She overall noted her pain level to be 7.0/10.  She is aware not to take nonsteroidals in view of the renal impairment.  Pain is described as sharp. It is worse with activity at times bedtime..  Her symptoms are  moderately severe. The symptoms are progressive.  Associated findings include /do not include: swelling, rash.  There is no associated recent fall or trauma.  Over-the-counter treatment to date has been without significant relief.    Further historical information, including ROS and limitation in activities as noted in the multidimensional health assessment questionnaire scanned in the EMR and in the assessment and plan section.    ALLERGIES:Acetaminophen and Tomato    PAST MEDICAL/ACTIVE PROBLEMS/MEDICATION/SOCIAL DATA  No past medical history on file.  Social History     Tobacco Use   Smoking Status Never Smoker   Smokeless Tobacco Never Used     Patient Active Problem List   Diagnosis     Morbid Obesity     Arthralgias In Multiple Sites     Bursitis     Osteoarthritis Of The Knee     Immunology Studies Nonspecific Abnormal Findings     OA (osteoarthritis)     Primary osteoarthritis involving multiple joints     Pes planus of both feet     High risk medication use     Left shoulder tendonitis     Left shoulder pain     Fibromyalgia     Current Outpatient Medications   Medication Sig Dispense Refill     calcium carbonate (OS-ROMAN) 600 mg (1,500 mg) tablet Take by mouth. Take 2 tablet 3 times daily.       candesartan (ATACAND) 8 MG tablet        cholecalciferol, vitamin D3, 400 unit Tab Take 800 Units by mouth daily.       DULoxetine (CYMBALTA) 30 MG capsule Take 30 mg by mouth daily.        gabapentin (NEURONTIN) 100 MG capsule TAKE 1 CAPSULE BY MOUTH THREE TIMES A DAY 90 capsule 3     hydrochlorothiazide (HYDRODIURIL) 25 MG tablet Take 25 mg by mouth daily.       metroNIDAZOLE (METROGEL) 0.75 % gel APPLY TO AFFECTED AREA TWICE A DAY       multivitamin (MULTIVITAMIN) per tablet Take 1 tablet by mouth daily.       omega-3 fatty acids-vitamin E (FISH OIL) 1,000 mg cap Take 2 capsules by mouth daily.       omeprazole (PRILOSEC) 20 MG capsule Take 20 mg by mouth daily. Delayed release.       traMADol (ULTRAM) 50 mg  tablet TAKE 1 TABLET EVERY 12     HOURS AS NEEDED FOR PAIN 180 tablet 0     UNABLE TO FIND 1 tablet daily. Med Name: Vitamin B-6 TABS       No current facility-administered medications for this visit.          DETAILED EXAMINATION  02/17/21  :  Vitals:    02/17/21 0818   BP: 128/72   Pulse: 74   Weight: (!) 233 lb 6.4 oz (105.9 kg)   Height: 5' (1.524 m)     Alert oriented. Head including the face is examined for malar rash, heliotropes, scarring, lupus pernio. Eyes examined for redness such as in episcleritis/scleritis, periorbital lesions.   Neck/ Face examined for parotid gland swelling, range of motion of neck.  Left upper and lower and right upper and lower extremities examined for tenderness, swelling, warmth of the appendicular joints, range of motion, edema, rash.  Some of the important findings included: She has impingement of the left shoulder, on abduction, which she has discomfort compared to the opposite side, painful knee popliteal region minimal bulging.  No warmth no definite effusion.           LAB / IMAGING DATA:  ALT   Date Value Ref Range Status   01/21/2019 26 0 - 45 U/L Final   09/19/2017 22 0 - 45 U/L Final   12/21/2016 28 0 - 45 U/L Final     Albumin   Date Value Ref Range Status   02/21/2020 4.1 3.5 - 5.0 g/dL Final   01/21/2019 3.9 3.5 - 5.0 g/dL Final   09/19/2017 3.9 3.5 - 5.0 g/dL Final     Creatinine   Date Value Ref Range Status   02/15/2021 1.20 (H) 0.60 - 1.10 mg/dL Final   12/03/2020 0.97 0.60 - 1.10 mg/dL Final   04/06/2020 1.17 (H) 0.60 - 1.10 mg/dL Final       WBC   Date Value Ref Range Status   09/19/2017 4.4 4.0 - 11.0 thou/uL Final   12/21/2016 3.7 (L) 4.0 - 11.0 thou/uL Final     Hemoglobin   Date Value Ref Range Status   09/19/2017 13.3 12.0 - 16.0 g/dL Final   12/21/2016 12.8 12.0 - 16.0 g/dL Final   04/08/2013 12.3 12.0 - 16.0 g/dL Final     Platelets   Date Value Ref Range Status   09/19/2017 204 140 - 440 thou/uL Final   12/21/2016 183 140 - 440 thou/uL Final    04/08/2013 204 140 - 440 thou/uL Final       No results found for: HILARY, RF, SEDRATE

## 2021-06-16 PROBLEM — N18.30 CHRONIC KIDNEY DISEASE, STAGE 3 (H): Status: ACTIVE | Noted: 2021-02-17

## 2021-06-16 PROBLEM — M25.512 LEFT SHOULDER PAIN: Status: ACTIVE | Noted: 2017-09-19

## 2021-06-16 PROBLEM — M79.7 FIBROMYALGIA: Status: ACTIVE | Noted: 2018-02-19

## 2021-06-16 PROBLEM — M77.8 LEFT SHOULDER TENDONITIS: Status: ACTIVE | Noted: 2017-09-19

## 2021-06-16 NOTE — PROGRESS NOTES
ASSESSMENT AND PLAN:  Natasha Nieto 62 y.o. female is here for follow-up of osteoarthritis, fibromyalgia.  She is complaining of increasing left knee pain.  This is consistent with osteoarthritis.  Discussed options.  She wants to proceed local injection.  40 mg of methylprednisolone injected anterolateral approach under ultrasound guidance.  She is to continue gabapentin and tramadol as now.  We discussed the optimal approach to use of tramadol.  Follow-up in 3 months.       Diagnoses and all orders for this visit:    Osteoarthritis Of The Knee  -     XR Knees Bilateral 1 Or 2 VWS; Future; Expected date: 2/19/18  -     XR Knees Bilateral 1 Or 2 VWS  -     Discontinue: methylPREDNISolone acetate injection 40 mg (DEPO-MEDROL); Inject 1 mL (40 mg total) into the joint once.  -     triamcinolone acetonide 40 mg/mL injection 40 mg (KENALOG-40); Inject 1 mL (40 mg total) into the joint once.    Primary osteoarthritis involving multiple joints  -     XR Knees Bilateral 1 Or 2 VWS; Future; Expected date: 2/19/18  -     XR Knees Bilateral 1 Or 2 VWS    Fibromyalgia          HISTORY OF PRESENTING ILLNESS:  Natasha Nieto 62 y.o. is here for follow-up of osteoarthritis, fibromyalgia.  She is complaining of increasing pain.  This is in her left knee.  This is noted to be severe.  This is troubled her for the past 2 months now.  She noted the events the knee buckled underneath her this was in December.  Since then it has troubled her off and on.  Sometimes the pain is more in the popliteal area.  This is typically with activity and not at rest.  Is not swollen.  She takes tramadol which helps.  She also take acetaminophen over-the-counter.  .  Morning stiffness is negligible if any.    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  She noted that her insurance is such that she has to pay significant amount for co-pay.  Further historical information,  including ROS and limitation in activities as noted in the multidimensional health assessment questionnaire scanned in the EMR and in the assessment and plan section.    ALLERGIES:Acetaminophen    PAST MEDICAL/ACTIVE PROBLEMS/MEDICATION/SOCIAL DATA  No past medical history on file.  History   Smoking Status     Never Smoker   Smokeless Tobacco     Never Used     Patient Active Problem List   Diagnosis     Morbid Obesity     Arthralgias In Multiple Sites     Bursitis     Osteoarthritis Of The Knee     Fibromyalgia     Immunology Studies Nonspecific Abnormal Findings     OA (osteoarthritis)     Primary osteoarthritis involving multiple joints     Pes planus of both feet     High risk medication use     Left shoulder tendonitis     Left shoulder pain     Current Outpatient Prescriptions   Medication Sig Dispense Refill     calcium carbonate (OS-ROMAN) 600 mg (1,500 mg) tablet Take by mouth. Take 2 tablet 3 times daily.       cholecalciferol, vitamin D3, 400 unit Tab Take 800 Units by mouth daily.       DULoxetine (CYMBALTA) 30 MG capsule Take 30 mg by mouth 3 (three) times a day.       gabapentin (NEURONTIN) 100 MG capsule TAKE 1 CAPSULE 3 TIMES A    capsule 0     hydrochlorothiazide (HYDRODIURIL) 25 MG tablet Take 25 mg by mouth daily.       multivitamin (MULTIVITAMIN) per tablet Take 1 tablet by mouth daily.       naproxen (NAPROSYN) 500 MG tablet TAKE 1 TABLET (500 MG TOTAL) BY MOUTH 2 (TWO) TIMES A DAY WITH MEALS. 180 tablet 0     omega-3 fatty acids-vitamin E (FISH OIL) 1,000 mg cap Take 2 capsules by mouth daily.       omeprazole (PRILOSEC) 20 MG capsule Take 20 mg by mouth daily. Delayed release.       traMADol (ULTRAM) 50 mg tablet TAKE 1 TABLET EVERY 12     HOURS AS NEEDED FOR PAIN 180 tablet 0     UNABLE TO FIND 1 tablet daily. Med Name: Vitamin B-6 TABS       No current facility-administered medications for this visit.        DETAILED EXAMINATION  02/19/18  :  Vitals:    02/19/18 1357   BP: 144/82    Pulse: 91   Resp: (!) 98   Weight: (!) 247 lb (112 kg)   Height: 5' (1.524 m)     Alert oriented. Head including the face is examined for malar rash, heliotropes, scarring, lupus pernio. Eyes examined for redness such as in episcleritis/scleritis, periorbital lesions.   Neck/ Face examined for parotid gland swelling, range of motion of neck.  Left upper and lower and right upper and lower extremities examined for tenderness, swelling, warmth of the appendicular joints, range of motion, edema, rash.  Some of the important findings included: She has a more warmth and JLT of the left knee.  She has excellent range of motion of the shoulders.  She walks with limp.      LAB / IMAGING DATA:  ALT   Date Value Ref Range Status   09/19/2017 22 0 - 45 U/L Final   12/21/2016 28 0 - 45 U/L Final   12/31/2015 29 0 - 45 U/L Final     Albumin   Date Value Ref Range Status   09/19/2017 3.9 3.5 - 5.0 g/dL Final   12/21/2016 3.8 3.5 - 5.0 g/dL Final   12/31/2015 4.0 3.5 - 5.0 g/dL Final     Creatinine   Date Value Ref Range Status   01/17/2018 0.84 0.60 - 1.10 mg/dL Final   09/19/2017 0.90 0.60 - 1.10 mg/dL Final   12/21/2016 0.72 0.60 - 1.10 mg/dL Final       WBC   Date Value Ref Range Status   09/19/2017 4.4 4.0 - 11.0 thou/uL Final   12/21/2016 3.7 (L) 4.0 - 11.0 thou/uL Final     Hemoglobin   Date Value Ref Range Status   09/19/2017 13.3 12.0 - 16.0 g/dL Final   12/21/2016 12.8 12.0 - 16.0 g/dL Final   04/08/2013 12.3 12.0 - 16.0 g/dL Final     Platelets   Date Value Ref Range Status   09/19/2017 204 140 - 440 thou/uL Final   12/21/2016 183 140 - 440 thou/uL Final   04/08/2013 204 140 - 440 thou/uL Final       No results found for: HILARY

## 2021-06-18 NOTE — PROGRESS NOTES
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  She is complaining of increasing right knee pain.  This is consistent with osteoarthritis.  She has had left knee pain responded nicely to Kenalog.  Prior to that in 2012 she had methylprednisolone and each of the knees and that provided only transient relief.  She wonders about Visco supplementation.  Pros and cons were outlined.  She would find it harder to travel 3 times.  She is inclined to avoid getting the corticosteroid injection.  Kenalog 40 mg injected under ultrasound guidance into the right knee anterolateral approach after pros and cons were reviewed with her.        Diagnoses and all orders for this visit:    Osteoarthritis Of The Knee  -     triamcinolone acetonide 40 mg/mL injection 40 mg (KENALOG-40); Inject 1 mL (40 mg total) into the joint once.    Arthralgias In Multiple Sites    Fibromyalgia          HISTORY OF PRESENTING ILLNESS:  Natasha Nieto 62 y.o. is here for follow-up of osteoarthritis, fibromyalgia.  She is complaining of increasing pain.  This is in her right knee.  This is noted to be severe.  This is troubled her for the past 2 months now.  She is somewhat frustrated given that she called here and was told that the next available is in June.  She went to see orthopedics.  They offered her Visco supplementation.  She was not sure.  She is back here now with the pain which is quite troublesome, moderately severe..  She noted the events the knee buckled underneath her this was in December.  Since then it has troubled her off and on.  Sometimes the pain is more in the popliteal area.  This is typically with activity and not at rest.  Is not swollen.  She takes tramadol which helps.  She also take acetaminophen  over-the-counter.  .  Morning stiffness is negligible if any.    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  She noted that her insurance is such that she has to pay significant amount for co-pay.  Further historical information, including ROS and limitation in activities as noted in the multidimensional health assessment questionnaire scanned in the EMR and in the assessment and plan section.    ALLERGIES:Acetaminophen    PAST MEDICAL/ACTIVE PROBLEMS/MEDICATION/SOCIAL DATA  No past medical history on file.  History   Smoking Status     Never Smoker   Smokeless Tobacco     Never Used     Patient Active Problem List   Diagnosis     Morbid Obesity     Arthralgias In Multiple Sites     Bursitis     Osteoarthritis Of The Knee     Immunology Studies Nonspecific Abnormal Findings     OA (osteoarthritis)     Primary osteoarthritis involving multiple joints     Pes planus of both feet     High risk medication use     Left shoulder tendonitis     Left shoulder pain     Fibromyalgia     Current Outpatient Prescriptions   Medication Sig Dispense Refill     calcium carbonate (OS-ROMAN) 600 mg (1,500 mg) tablet Take by mouth. Take 2 tablet 3 times daily.       cholecalciferol, vitamin D3, 400 unit Tab Take 800 Units by mouth daily.       DULoxetine (CYMBALTA) 30 MG capsule Take 30 mg by mouth 3 (three) times a day.       gabapentin (NEURONTIN) 100 MG capsule TAKE 1 CAPSULE 3 TIMES A    capsule 0     hydrochlorothiazide (HYDRODIURIL) 25 MG tablet Take 25 mg by mouth daily.       multivitamin (MULTIVITAMIN) per tablet Take 1 tablet by mouth daily.       naproxen (NAPROSYN) 500 MG tablet TAKE 1 TABLET (500 MG TOTAL) BY MOUTH 2 (TWO) TIMES A DAY WITH MEALS. 180 tablet 0     omega-3 fatty acids-vitamin E (FISH OIL) 1,000 mg cap Take 2 capsules by mouth daily.       omeprazole (PRILOSEC) 20 MG capsule Take 20 mg by mouth daily. Delayed release.       traMADol  (ULTRAM) 50 mg tablet TAKE 1 TABLET EVERY 12     HOURS AS NEEDED FOR PAIN 180 tablet 0     traMADol (ULTRAM) 50 mg tablet TAKE 1 TABLET EVERY 12     HOURS AS NEEDED FOR PAIN 180 tablet 0     UNABLE TO FIND 1 tablet daily. Med Name: Vitamin B-6 TABS       No current facility-administered medications for this visit.        DETAILED EXAMINATION  06/07/18  :  Vitals:    06/07/18 1141   BP: 138/72   Patient Site: Right Arm   Patient Position: Sitting   Cuff Size: Adult Regular   Pulse: 60   Weight: (!) 250 lb (113.4 kg)     Alert oriented. Head including the face is examined for malar rash, heliotropes, scarring, lupus pernio. Eyes examined for redness such as in episcleritis/scleritis, periorbital lesions.   Neck/ Face examined for parotid gland swelling, range of motion of neck.  Left upper and lower and right upper and lower extremities examined for tenderness, swelling, warmth of the appendicular joints, range of motion, edema, rash.  Some of the important findings included: She has a more warmth and JLT of the right knee.  She has excellent range of motion of the shoulders.  She walks with limp.      LAB / IMAGING DATA:  ALT   Date Value Ref Range Status   09/19/2017 22 0 - 45 U/L Final   12/21/2016 28 0 - 45 U/L Final   12/31/2015 29 0 - 45 U/L Final     Albumin   Date Value Ref Range Status   09/19/2017 3.9 3.5 - 5.0 g/dL Final   12/21/2016 3.8 3.5 - 5.0 g/dL Final   12/31/2015 4.0 3.5 - 5.0 g/dL Final     Creatinine   Date Value Ref Range Status   01/17/2018 0.84 0.60 - 1.10 mg/dL Final   09/19/2017 0.90 0.60 - 1.10 mg/dL Final   12/21/2016 0.72 0.60 - 1.10 mg/dL Final       WBC   Date Value Ref Range Status   09/19/2017 4.4 4.0 - 11.0 thou/uL Final   12/21/2016 3.7 (L) 4.0 - 11.0 thou/uL Final     Hemoglobin   Date Value Ref Range Status   09/19/2017 13.3 12.0 - 16.0 g/dL Final   12/21/2016 12.8 12.0 - 16.0 g/dL Final   04/08/2013 12.3 12.0 - 16.0 g/dL Final     Platelets   Date Value Ref Range Status    09/19/2017 204 140 - 440 thou/uL Final   12/21/2016 183 140 - 440 thou/uL Final   04/08/2013 204 140 - 440 thou/uL Final       No results found for: HILARY

## 2021-06-20 NOTE — PROGRESS NOTES
ASSESSMENT AND PLAN:  Natasha Nieto 62 y.o. 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  She is complaining of increasing left knee pain consistent with osteoarthritis.  We discussed getting back on gabapentin.  Management of osteoarthritis reviewed.  She wonders if the left knee can be injected as she had a good response to the right knee injection.  Pros and cons are outlined.  An ultrasound guidance, given the BMI at 48.5, 40 mg of Kenalog injected in the left knee anterolateral approach she tolerated the procedure well.       Diagnoses and all orders for this visit:    Osteoarthritis Of The Knee  -     triamcinolone acetonide 40 mg/mL injection 40 mg (KENALOG-40); Inject 1 mL (40 mg total) into the joint once.  -     traMADol (ULTRAM) 50 mg tablet; TAKE 1 TABLET EVERY 12     HOURS AS NEEDED FOR PAIN  Dispense: 180 tablet; Refill: 0  -     gabapentin (NEURONTIN) 100 MG capsule; Take 100 mg by mouth 2 (two) times a day.  Dispense: 180 capsule; Refill: 0    Primary osteoarthritis involving multiple joints  -     triamcinolone acetonide 40 mg/mL injection 40 mg (KENALOG-40); Inject 1 mL (40 mg total) into the joint once.  -     traMADol (ULTRAM) 50 mg tablet; TAKE 1 TABLET EVERY 12     HOURS AS NEEDED FOR PAIN  Dispense: 180 tablet; Refill: 0  -     gabapentin (NEURONTIN) 100 MG capsule; Take 100 mg by mouth 2 (two) times a day.  Dispense: 180 capsule; Refill: 0  -     naproxen (NAPROSYN) 500 MG tablet; Take 1 tablet (500 mg total) by mouth 2 (two) times a day with meals.  Dispense: 180 tablet; Refill: 0    Polyarthralgia  -     traMADol (ULTRAM) 50 mg tablet; TAKE 1 TABLET EVERY 12     HOURS AS NEEDED FOR PAIN  Dispense: 180 tablet; Refill: 0  -     gabapentin (NEURONTIN) 100 MG capsule; Take 100 mg by mouth 2 (two) times a day.   Dispense: 180 capsule; Refill: 0    Arthralgias In Multiple Sites  -     naproxen (NAPROSYN) 500 MG tablet; Take 1 tablet (500 mg total) by mouth 2 (two) times a day with meals.  Dispense: 180 tablet; Refill: 0        HISTORY OF PRESENTING ILLNESS:  Natasha Nieto 62 y.o. is here for follow-up of osteoarthritis, fibromyalgia.  She is complaining of increasing pain.  This is generalized but more so in the left knee.  Troubled her for the past several weeks.  She has noted improvement with tramadol of which she takes sparingly.  She is also on gabapentin.  We talked about if she can cut back on that.  She is going to try to take twice daily.  She has noted pain level to be moderately severe, 6.5/10 the worst is when she is getting in and out of the car, walking.  There is no significant stiffness in the morning beyond the first 20-30 minutes.  She continues to do her 2 jobs.  There is no fever weight loss blurry vision eye redness moccasin nausea cough or rash.     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  She noted that her insurance is such that she has to pay significant amount for co-pay.  Further historical information, including ROS and limitation in activities as noted in the multidimensional health assessment questionnaire scanned in the EMR and in the assessment and plan section.    ALLERGIES:Acetaminophen and Tomato    PAST MEDICAL/ACTIVE PROBLEMS/MEDICATION/SOCIAL DATA  No past medical history on file.  History   Smoking Status     Never Smoker   Smokeless Tobacco     Never Used     Patient Active Problem List   Diagnosis     Morbid Obesity     Arthralgias In Multiple Sites     Bursitis     Osteoarthritis Of The Knee     Immunology Studies Nonspecific Abnormal Findings     OA (osteoarthritis)     Primary osteoarthritis involving multiple joints     Pes planus of both feet     High risk medication use     Left shoulder tendonitis      Left shoulder pain     Fibromyalgia     Current Outpatient Prescriptions   Medication Sig Dispense Refill     calcium carbonate (OS-ROMAN) 600 mg (1,500 mg) tablet Take by mouth. Take 2 tablet 3 times daily.       cholecalciferol, vitamin D3, 400 unit Tab Take 800 Units by mouth daily.       DULoxetine (CYMBALTA) 30 MG capsule Take 30 mg by mouth 3 (three) times a day.       gabapentin (NEURONTIN) 100 MG capsule TAKE 1 CAPSULE 3 TIMES A    capsule 0     hydrochlorothiazide (HYDRODIURIL) 25 MG tablet Take 25 mg by mouth daily.       multivitamin (MULTIVITAMIN) per tablet Take 1 tablet by mouth daily.       naproxen (NAPROSYN) 500 MG tablet TAKE 1 TABLET (500 MG TOTAL) BY MOUTH 2 (TWO) TIMES A DAY WITH MEALS. 180 tablet 0     omega-3 fatty acids-vitamin E (FISH OIL) 1,000 mg cap Take 2 capsules by mouth daily.       omeprazole (PRILOSEC) 20 MG capsule Take 20 mg by mouth daily. Delayed release.       traMADol (ULTRAM) 50 mg tablet TAKE 1 TABLET EVERY 12     HOURS AS NEEDED FOR PAIN 180 tablet 0     UNABLE TO FIND 1 tablet daily. Med Name: Vitamin B-6 TABS       No current facility-administered medications for this visit.        DETAILED EXAMINATION  10/08/18  :  Vitals:    10/08/18 1205   BP: 128/64   Weight: (!) 248 lb 6.4 oz (112.7 kg)   Height: 5' (1.524 m)     Alert oriented. Head including the face is examined for malar rash, heliotropes, scarring, lupus pernio. Eyes examined for redness such as in episcleritis/scleritis, periorbital lesions.   Neck/ Face examined for parotid gland swelling, range of motion of neck.  Left upper and lower and right upper and lower extremities examined for tenderness, swelling, warmth of the appendicular joints, range of motion, edema, rash.  Some of the important findings included: She has warmth of the left knee, joint line tenderness which is marked compared with the right side.  She has plenty of adipose tissue surrounding the knee joint lines however.  There is no  synovitis of the palpable joints of the upper extremities.  There is no popliteal cyst that is palpable.    LAB / IMAGING DATA:  ALT   Date Value Ref Range Status   09/19/2017 22 0 - 45 U/L Final   12/21/2016 28 0 - 45 U/L Final   12/31/2015 29 0 - 45 U/L Final     Albumin   Date Value Ref Range Status   09/19/2017 3.9 3.5 - 5.0 g/dL Final   12/21/2016 3.8 3.5 - 5.0 g/dL Final   12/31/2015 4.0 3.5 - 5.0 g/dL Final     Creatinine   Date Value Ref Range Status   01/17/2018 0.84 0.60 - 1.10 mg/dL Final   09/19/2017 0.90 0.60 - 1.10 mg/dL Final   12/21/2016 0.72 0.60 - 1.10 mg/dL Final       WBC   Date Value Ref Range Status   09/19/2017 4.4 4.0 - 11.0 thou/uL Final   12/21/2016 3.7 (L) 4.0 - 11.0 thou/uL Final     Hemoglobin   Date Value Ref Range Status   09/19/2017 13.3 12.0 - 16.0 g/dL Final   12/21/2016 12.8 12.0 - 16.0 g/dL Final   04/08/2013 12.3 12.0 - 16.0 g/dL Final     Platelets   Date Value Ref Range Status   09/19/2017 204 140 - 440 thou/uL Final   12/21/2016 183 140 - 440 thou/uL Final   04/08/2013 204 140 - 440 thou/uL Final       No results found for: HILARY

## 2021-06-20 NOTE — PROGRESS NOTES
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  She is complaining of increasing right knee pain.  This is consistent with osteoarthritis.  She has had left knee pain responded nicely to Kenalog.  Prior to that in 2012 she had methylprednisolone and each of the knees and that provided only transient relief.  She wonders about Visco supplementation.  Pros and cons were outlined.  She would find it harder to travel 3 times.  She is inclined to avoid getting the corticosteroid injection.  Kenalog 40 mg injected under ultrasound guidance into the right knee anterolateral approach after pros and cons were reviewed with her.        Diagnoses and all orders for this visit:    Osteoarthritis Of The Knee  -     triamcinolone acetonide 40 mg/mL injection 40 mg (KENALOG-40); Inject 1 mL (40 mg total) into the joint once.  -     traMADol (ULTRAM) 50 mg tablet; TAKE 1 TABLET EVERY 12     HOURS AS NEEDED FOR PAIN  Dispense: 180 tablet; Refill: 0  -     gabapentin (NEURONTIN) 100 MG capsule; Take 100 mg by mouth 2 (two) times a day.  Dispense: 180 capsule; Refill: 0    Primary osteoarthritis involving multiple joints  -     triamcinolone acetonide 40 mg/mL injection 40 mg (KENALOG-40); Inject 1 mL (40 mg total) into the joint once.  -     traMADol (ULTRAM) 50 mg tablet; TAKE 1 TABLET EVERY 12     HOURS AS NEEDED FOR PAIN  Dispense: 180 tablet; Refill: 0  -     gabapentin (NEURONTIN) 100 MG capsule; Take 100 mg by mouth 2 (two) times a day.  Dispense: 180 capsule; Refill: 0  -     naproxen (NAPROSYN) 500 MG tablet; Take 1 tablet (500 mg total) by mouth 2 (two) times a day with meals.  Dispense: 180 tablet; Refill: 0    Polyarthralgia  -     traMADol (ULTRAM) 50 mg tablet; TAKE 1 TABLET EVERY 12     HOURS AS  NEEDED FOR PAIN  Dispense: 180 tablet; Refill: 0  -     gabapentin (NEURONTIN) 100 MG capsule; Take 100 mg by mouth 2 (two) times a day.  Dispense: 180 capsule; Refill: 0    Arthralgias In Multiple Sites  -     naproxen (NAPROSYN) 500 MG tablet; Take 1 tablet (500 mg total) by mouth 2 (two) times a day with meals.  Dispense: 180 tablet; Refill: 0        HISTORY OF PRESENTING ILLNESS:  Natasha Nieto 62 y.o. is here for follow-up of osteoarthritis, fibromyalgia.  She is complaining of increasing pain.  This is in her right knee.  This is noted to be severe.  This is troubled her for the past 2 months now.  She is somewhat frustrated given that she called here and was told that the next available is in June.  She went to see orthopedics.  They offered her Visco supplementation.  She was not sure.  She is back here now with the pain which is quite troublesome, moderately severe..  She noted the events the knee buckled underneath her this was in December.  Since then it has troubled her off and on.  Sometimes the pain is more in the popliteal area.  This is typically with activity and not at rest.  Is not swollen.  She takes tramadol which helps.  She also take acetaminophen over-the-counter.  .  Morning stiffness is negligible if any.    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  She noted that her insurance is such that she has to pay significant amount for co-pay.  Further historical information, including ROS and limitation in activities as noted in the multidimensional health assessment questionnaire scanned in the EMR and in the assessment and plan section.    ALLERGIES:Acetaminophen and Tomato    PAST MEDICAL/ACTIVE PROBLEMS/MEDICATION/SOCIAL DATA  No past medical history on file.  History   Smoking Status     Never Smoker   Smokeless Tobacco     Never Used     Patient Active Problem List   Diagnosis     Morbid Obesity     Arthralgias In  Multiple Sites     Bursitis     Osteoarthritis Of The Knee     Immunology Studies Nonspecific Abnormal Findings     OA (osteoarthritis)     Primary osteoarthritis involving multiple joints     Pes planus of both feet     High risk medication use     Left shoulder tendonitis     Left shoulder pain     Fibromyalgia     Current Outpatient Prescriptions   Medication Sig Dispense Refill     calcium carbonate (OS-ROMAN) 600 mg (1,500 mg) tablet Take by mouth. Take 2 tablet 3 times daily.       cholecalciferol, vitamin D3, 400 unit Tab Take 800 Units by mouth daily.       DULoxetine (CYMBALTA) 30 MG capsule Take 30 mg by mouth 3 (three) times a day.       gabapentin (NEURONTIN) 100 MG capsule TAKE 1 CAPSULE 3 TIMES A    capsule 0     hydrochlorothiazide (HYDRODIURIL) 25 MG tablet Take 25 mg by mouth daily.       multivitamin (MULTIVITAMIN) per tablet Take 1 tablet by mouth daily.       naproxen (NAPROSYN) 500 MG tablet TAKE 1 TABLET (500 MG TOTAL) BY MOUTH 2 (TWO) TIMES A DAY WITH MEALS. 180 tablet 0     omega-3 fatty acids-vitamin E (FISH OIL) 1,000 mg cap Take 2 capsules by mouth daily.       omeprazole (PRILOSEC) 20 MG capsule Take 20 mg by mouth daily. Delayed release.       traMADol (ULTRAM) 50 mg tablet TAKE 1 TABLET EVERY 12     HOURS AS NEEDED FOR PAIN 180 tablet 0     UNABLE TO FIND 1 tablet daily. Med Name: Vitamin B-6 TABS       No current facility-administered medications for this visit.        DETAILED EXAMINATION  10/08/18  :  Vitals:    10/08/18 1205   BP: 128/64   Weight: (!) 248 lb 6.4 oz (112.7 kg)   Height: 5' (1.524 m)     Alert oriented. Head including the face is examined for malar rash, heliotropes, scarring, lupus pernio. Eyes examined for redness such as in episcleritis/scleritis, periorbital lesions.   Neck/ Face examined for parotid gland swelling, range of motion of neck.  Left upper and lower and right upper and lower extremities examined for tenderness, swelling, warmth of the  appendicular joints, range of motion, edema, rash.  Some of the important findings included: She has a more warmth and JLT of the right knee.  She has excellent range of motion of the shoulders.  She walks with limp.      LAB / IMAGING DATA:  ALT   Date Value Ref Range Status   09/19/2017 22 0 - 45 U/L Final   12/21/2016 28 0 - 45 U/L Final   12/31/2015 29 0 - 45 U/L Final     Albumin   Date Value Ref Range Status   09/19/2017 3.9 3.5 - 5.0 g/dL Final   12/21/2016 3.8 3.5 - 5.0 g/dL Final   12/31/2015 4.0 3.5 - 5.0 g/dL Final     Creatinine   Date Value Ref Range Status   01/17/2018 0.84 0.60 - 1.10 mg/dL Final   09/19/2017 0.90 0.60 - 1.10 mg/dL Final   12/21/2016 0.72 0.60 - 1.10 mg/dL Final       WBC   Date Value Ref Range Status   09/19/2017 4.4 4.0 - 11.0 thou/uL Final   12/21/2016 3.7 (L) 4.0 - 11.0 thou/uL Final     Hemoglobin   Date Value Ref Range Status   09/19/2017 13.3 12.0 - 16.0 g/dL Final   12/21/2016 12.8 12.0 - 16.0 g/dL Final   04/08/2013 12.3 12.0 - 16.0 g/dL Final     Platelets   Date Value Ref Range Status   09/19/2017 204 140 - 440 thou/uL Final   12/21/2016 183 140 - 440 thou/uL Final   04/08/2013 204 140 - 440 thou/uL Final       No results found for: HILARY

## 2021-06-23 NOTE — PROGRESS NOTES
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  Overall she noted that her symptoms are better controlled than they have in the past.  She feels that the gabapentin 3 times a day is better for her pain control then twice daily.  So we will stay with 3 times daily.  For tramadol have given her prescription today but for future prescriptions she will connect with her primary physician where she gets his Cymbalta from.  Her knee injection with steroids was helpful, done on her previous visit, today we both agreed that there is no indication to repeat.  After this appointment she is headed to her primary physician Dr. Abraham's office for a physical therefore labs such as ALT, creatinine will be deferred for that visit.  We will meet here in 4 months.       Diagnoses and all orders for this visit:    Primary osteoarthritis involving multiple joints  -     traMADol (ULTRAM) 50 mg tablet  Dispense: 180 tablet; Refill: 0    Polyarthralgia  -     traMADol (ULTRAM) 50 mg tablet  Dispense: 180 tablet; Refill: 0    Osteoarthritis Of The Knee  -     traMADol (ULTRAM) 50 mg tablet  Dispense: 180 tablet; Refill: 0    High risk medication use          HISTORY OF PRESENTING ILLNESS:  Natasha Nieto 63 y.o. is here for follow-up of osteoarthritis, fibromyalgia.  Overall she has felt significantly better, the knee injection was helpful to, she found gabapentin 3 times daily is better for her pain control than 2 times daily, she gets duloxetine from her primary physician.  She takes tramadol twice daily.  Overall noted pain level to be mild, able to do most of her day-to-day activities without any or with some difficulty, morning stiffness no more than half an hour to an hour, she has not had fever weight loss  blurry vision eye redness she has had no nausea cough or rash.  She gets mouth ulcers with citrus products.  After this appointment she has had it for a physical at her primary physicians..     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  She noted that her insurance is such that she has to pay significant amount for co-pay.  Further historical information, including ROS and limitation in activities as noted in the multidimensional health assessment questionnaire scanned in the EMR and in the assessment and plan section.    ALLERGIES:Acetaminophen and Tomato    PAST MEDICAL/ACTIVE PROBLEMS/MEDICATION/SOCIAL DATA  No past medical history on file.  Social History     Tobacco Use   Smoking Status Never Smoker   Smokeless Tobacco Never Used     Patient Active Problem List   Diagnosis     Morbid Obesity     Arthralgias In Multiple Sites     Bursitis     Osteoarthritis Of The Knee     Immunology Studies Nonspecific Abnormal Findings     OA (osteoarthritis)     Primary osteoarthritis involving multiple joints     Pes planus of both feet     High risk medication use     Left shoulder tendonitis     Left shoulder pain     Fibromyalgia     Current Outpatient Medications   Medication Sig Dispense Refill     calcium carbonate (OS-ROMAN) 600 mg (1,500 mg) tablet Take by mouth. Take 2 tablet 3 times daily.       cholecalciferol, vitamin D3, 400 unit Tab Take 800 Units by mouth daily.       DULoxetine (CYMBALTA) 30 MG capsule Take 30 mg by mouth 3 (three) times a day.       gabapentin (NEURONTIN) 100 MG capsule TAKE 1 CAPSULE 3 TIMES A    capsule 0     hydrochlorothiazide (HYDRODIURIL) 25 MG tablet Take 25 mg by mouth daily.       multivitamin (MULTIVITAMIN) per tablet Take 1 tablet by mouth daily.       naproxen (NAPROSYN) 500 MG tablet Take 1 tablet (500 mg total) by mouth 2 (two) times a day with meals. 180 tablet 0     omega-3 fatty acids-vitamin E (FISH  OIL) 1,000 mg cap Take 2 capsules by mouth daily.       omeprazole (PRILOSEC) 20 MG capsule Take 20 mg by mouth daily. Delayed release.       traMADol (ULTRAM) 50 mg tablet TAKE 1 TABLET EVERY 12     HOURS AS NEEDED FOR PAIN 180 tablet 0     UNABLE TO FIND 1 tablet daily. Med Name: Vitamin B-6 TABS       gabapentin (NEURONTIN) 100 MG capsule Take 100 mg by mouth 2 (two) times a day. 180 capsule 0     No current facility-administered medications for this visit.        DETAILED EXAMINATION  01/21/19  :  Vitals:    01/21/19 1026 01/21/19 1041   BP: 162/80 158/80   Patient Site: Right Arm Right Arm   Patient Position: Sitting Sitting   Cuff Size: Adult Large Adult Large   Pulse: (!) 48    Weight: (!) 245 lb (111.1 kg)      Alert oriented. Head including the face is examined for malar rash, heliotropes, scarring, lupus pernio. Eyes examined for redness such as in episcleritis/scleritis, periorbital lesions.   Neck/ Face examined for parotid gland swelling, range of motion of neck.  Left upper and lower and right upper and lower extremities examined for tenderness, swelling, warmth of the appendicular joints, range of motion, edema, rash.  Some of the important findings included: No synovitis of the palpable joints of upper extremities minimal JLT of the knees.      LAB / IMAGING DATA:  ALT   Date Value Ref Range Status   09/19/2017 22 0 - 45 U/L Final   12/21/2016 28 0 - 45 U/L Final   12/31/2015 29 0 - 45 U/L Final     Albumin   Date Value Ref Range Status   09/19/2017 3.9 3.5 - 5.0 g/dL Final   12/21/2016 3.8 3.5 - 5.0 g/dL Final   12/31/2015 4.0 3.5 - 5.0 g/dL Final     Creatinine   Date Value Ref Range Status   01/17/2018 0.84 0.60 - 1.10 mg/dL Final   09/19/2017 0.90 0.60 - 1.10 mg/dL Final   12/21/2016 0.72 0.60 - 1.10 mg/dL Final       WBC   Date Value Ref Range Status   09/19/2017 4.4 4.0 - 11.0 thou/uL Final   12/21/2016 3.7 (L) 4.0 - 11.0 thou/uL Final     Hemoglobin   Date Value Ref Range Status   09/19/2017  13.3 12.0 - 16.0 g/dL Final   12/21/2016 12.8 12.0 - 16.0 g/dL Final   04/08/2013 12.3 12.0 - 16.0 g/dL Final     Platelets   Date Value Ref Range Status   09/19/2017 204 140 - 440 thou/uL Final   12/21/2016 183 140 - 440 thou/uL Final   04/08/2013 204 140 - 440 thou/uL Final       No results found for: HILARY

## 2021-07-03 NOTE — ADDENDUM NOTE
Addendum Note by Shivani Gtz RN at 11/20/2019  8:20 AM     Author: Shivani Gtz RN Service: -- Author Type: Registered Nurse    Filed: 11/20/2019  8:20 AM Encounter Date: 11/19/2019 Status: Signed    : Shivani Gtz RN (Registered Nurse)    Addended by: SHIVANI GTZ on: 11/20/2019 08:20 AM        Modules accepted: Orders

## 2021-07-03 NOTE — ADDENDUM NOTE
Addendum Note by Luis Spencer MBBS at 2/19/2018  5:23 PM     Author: Luis Spencer MBBS Service: -- Author Type: Physician    Filed: 2/19/2018  5:23 PM Encounter Date: 2/19/2018 Status: Signed    : Luis Spencer MBBS (Physician)    Addended by: LUIS SPENCER on: 2/19/2018 05:23 PM        Modules accepted: Orders

## 2021-07-13 ENCOUNTER — RECORDS - HEALTHEAST (OUTPATIENT)
Dept: ADMINISTRATIVE | Facility: CLINIC | Age: 66
End: 2021-07-13

## 2021-07-21 ENCOUNTER — RECORDS - HEALTHEAST (OUTPATIENT)
Dept: ADMINISTRATIVE | Facility: CLINIC | Age: 66
End: 2021-07-21

## 2021-08-09 ENCOUNTER — VIRTUAL VISIT (OUTPATIENT)
Dept: RHEUMATOLOGY | Facility: CLINIC | Age: 66
End: 2021-08-09
Payer: COMMERCIAL

## 2021-08-09 DIAGNOSIS — M25.50 POLYARTHRALGIA: ICD-10-CM

## 2021-08-09 DIAGNOSIS — M15.0 PRIMARY OSTEOARTHRITIS INVOLVING MULTIPLE JOINTS: Primary | ICD-10-CM

## 2021-08-09 DIAGNOSIS — N18.32 STAGE 3B CHRONIC KIDNEY DISEASE (H): ICD-10-CM

## 2021-08-09 DIAGNOSIS — M79.7 FIBROMYALGIA: ICD-10-CM

## 2021-08-09 PROCEDURE — 99214 OFFICE O/P EST MOD 30 MIN: CPT | Mod: 95 | Performed by: INTERNAL MEDICINE

## 2021-08-09 NOTE — PROGRESS NOTES
Deana is a 65 year old who is being evaluated via a billable telephone visit.      What phone number would you like to be contacted at? [phone]  How would you like to obtain your AVS? Mail a copy  Phone call duration: 6 minutes    This document was created using a software with less than 100% fidelity, at times resulting in unintended, even erroneous syntax and grammar.  The reader is advised to keep this under consideration while reviewing, interpreting this note.           ASSESSMENT AND PLAN:    Diagnoses and all orders for this visit:  Primary osteoarthritis involving multiple joints  Stage 3b chronic kidney disease  Polyarthralgia  Fibromyalgia      Follow up in 3 months      HISTORY OF PRESENTING ILLNESS:  Natasha Nieto 65 year old is evaluated here via phone  link. is here for follow-up.  And she has polyarthralgia in association with widespread osteoarthritis.  She is on tramadol gabapentin and duloxetine.  Her primary physician recently increased the dose of gabapentin.  This is helped her joint symptoms.  She also finds corticosteroid injections are helpful for her knees.  These do not help her shoulder symptoms however.  Currently her symptoms are under good control she is to continue as now.  She will follow up in 3 months or sooner should there be a need for corticosteroid injection in the knee joint.  She will also continue to follow-up with her primary physician Dr. Abraham.  In view of the renal impairment continue to avoid nonsteroidals.    She wondered if I could give her a note to exempt her from COVID-19 vaccination, we discussed this.  There is no reason from my perspective for her not to have vaccination.       ROS enquiry held for fever, ocular symptoms, rash, headache,  GI issues.  Today we also discussed the issues related to the current pandemic, the pros and cons of the current treatment plan, the CDC guidelines such as social distancing washing the hands covering the  cough.  ALLERGIES:Acetaminophen and Tomato    PAST MEDICAL/ACTIVE PROBLEMS/MEDICATION/SOCIAL DATA  No past medical history on file.  History   Smoking Status     Never Smoker   Smokeless Tobacco     Never Used     Patient Active Problem List   Diagnosis     Morbid Obesity     Arthralgias In Multiple Sites     Bursitis     Osteoarthritis Of The Knee     Immunology Studies Nonspecific Abnormal Findings     OA (osteoarthritis)     Primary osteoarthritis involving multiple joints     Pes planus of both feet     High risk medication use     Left shoulder tendonitis     Left shoulder pain     Fibromyalgia     Chronic kidney disease, stage 3     Current Outpatient Medications   Medication Sig Dispense Refill     calcium carbonate (OS-ROMAN) 600 mg (1,500 mg) tablet [CALCIUM CARBONATE (OS-ROMAN) 600 MG (1,500 MG) TABLET] Take by mouth. Take 2 tablet 3 times daily.       candesartan (ATACAND) 8 MG tablet [CANDESARTAN (ATACAND) 8 MG TABLET]        cholecalciferol, vitamin D3, 400 unit Tab [CHOLECALCIFEROL, VITAMIN D3, 400 UNIT TAB] Take 800 Units by mouth daily.       DULoxetine (CYMBALTA) 30 MG capsule [DULOXETINE (CYMBALTA) 30 MG CAPSULE] Take 30 mg by mouth daily.        gabapentin (NEURONTIN) 100 MG capsule [GABAPENTIN (NEURONTIN) 100 MG CAPSULE] TAKE 1 CAPSULE BY MOUTH THREE TIMES A DAY (Patient taking differently: Take 100-200 mg by mouth 3 times daily ) 90 capsule 2     hydrochlorothiazide (HYDRODIURIL) 25 MG tablet [HYDROCHLOROTHIAZIDE (HYDRODIURIL) 25 MG TABLET] Take 25 mg by mouth daily.       MEDICATION CANNOT BE REORDERED - PLEASE MANUALLY REORDER AND DISCONTINUE THE OLD ORDER [OMEGA-3 FATTY ACIDS-VITAMIN E (FISH OIL) 1,000 MG CAP] Take 2 capsules by mouth daily.       metroNIDAZOLE (METROGEL) 0.75 % gel [METRONIDAZOLE (METROGEL) 0.75 % GEL] APPLY TO AFFECTED AREA TWICE A DAY       multivitamin (MULTIVITAMIN) per tablet [MULTIVITAMIN (MULTIVITAMIN) PER TABLET] Take 1 tablet by mouth daily.       omeprazole (PRILOSEC)  20 MG capsule [OMEPRAZOLE (PRILOSEC) 20 MG CAPSULE] Take 20 mg by mouth daily. Delayed release.       traMADol (ULTRAM) 50 mg tablet [TRAMADOL (ULTRAM) 50 MG TABLET] TAKE 1 TABLET EVERY 12     HOURS AS NEEDED FOR PAIN 180 tablet 0     UNABLE TO FIND [UNABLE TO FIND] 1 tablet daily. Med Name: Vitamin B-6 TABS           EXAMINATION:     On the phone she sounded comfortable, alert, oriented, her speech was fluent.  Her memory recall appeared normal.  She did not sound like she was in pain or short of breath.        LAB / IMAGING DATA:  ALT   Date Value Ref Range Status   01/21/2019 26 0 - 45 U/L Final   09/19/2017 22 0 - 45 U/L Final   12/21/2016 28 0 - 45 U/L Final     Albumin   Date Value Ref Range Status   02/21/2020 4.1 3.5 - 5.0 g/dL Final   01/21/2019 3.9 3.5 - 5.0 g/dL Final       No results found for: WBC, HGB, PLT    No results found for: HILARY

## 2021-12-27 ENCOUNTER — LAB REQUISITION (OUTPATIENT)
Dept: LAB | Facility: CLINIC | Age: 66
End: 2021-12-27

## 2021-12-27 DIAGNOSIS — I10 ESSENTIAL (PRIMARY) HYPERTENSION: ICD-10-CM

## 2021-12-27 DIAGNOSIS — I12.9 HYPERTENSIVE CHRONIC KIDNEY DISEASE WITH STAGE 1 THROUGH STAGE 4 CHRONIC KIDNEY DISEASE, OR UNSPECIFIED CHRONIC KIDNEY DISEASE: ICD-10-CM

## 2021-12-27 LAB
ANION GAP SERPL CALCULATED.3IONS-SCNC: 10 MMOL/L (ref 5–18)
BUN SERPL-MCNC: 29 MG/DL (ref 8–22)
CALCIUM SERPL-MCNC: 10.6 MG/DL (ref 8.5–10.5)
CHLORIDE BLD-SCNC: 104 MMOL/L (ref 98–107)
CHOLEST SERPL-MCNC: 274 MG/DL
CO2 SERPL-SCNC: 28 MMOL/L (ref 22–31)
CREAT SERPL-MCNC: 1.28 MG/DL (ref 0.6–1.1)
GFR SERPL CREATININE-BSD FRML MDRD: 46 ML/MIN/1.73M2
GLUCOSE BLD-MCNC: 87 MG/DL (ref 70–125)
HDLC SERPL-MCNC: 90 MG/DL
LDLC SERPL CALC-MCNC: 170 MG/DL
POTASSIUM BLD-SCNC: 4.6 MMOL/L (ref 3.5–5)
SODIUM SERPL-SCNC: 142 MMOL/L (ref 136–145)
TRIGL SERPL-MCNC: 71 MG/DL

## 2021-12-27 PROCEDURE — 80048 BASIC METABOLIC PNL TOTAL CA: CPT | Performed by: FAMILY MEDICINE

## 2021-12-27 PROCEDURE — 80061 LIPID PANEL: CPT | Performed by: FAMILY MEDICINE

## 2022-04-26 ENCOUNTER — HOSPITAL ENCOUNTER (OUTPATIENT)
Dept: MAMMOGRAPHY | Facility: CLINIC | Age: 67
Discharge: HOME | End: 2022-04-26
Attending: FAMILY MEDICINE | Admitting: FAMILY MEDICINE
Payer: COMMERCIAL

## 2022-04-26 DIAGNOSIS — Z12.31 VISIT FOR SCREENING MAMMOGRAM: ICD-10-CM

## 2022-04-26 PROCEDURE — 77067 SCR MAMMO BI INCL CAD: CPT

## 2022-05-25 ENCOUNTER — LAB REQUISITION (OUTPATIENT)
Dept: LAB | Facility: CLINIC | Age: 67
End: 2022-05-25

## 2022-05-25 DIAGNOSIS — I10 ESSENTIAL (PRIMARY) HYPERTENSION: ICD-10-CM

## 2022-05-25 DIAGNOSIS — E78.5 HYPERLIPIDEMIA, UNSPECIFIED: ICD-10-CM

## 2022-05-25 LAB
ANION GAP SERPL CALCULATED.3IONS-SCNC: 10 MMOL/L (ref 5–18)
BUN SERPL-MCNC: 24 MG/DL (ref 8–22)
CALCIUM SERPL-MCNC: 10.2 MG/DL (ref 8.5–10.5)
CHLORIDE BLD-SCNC: 101 MMOL/L (ref 98–107)
CHOLEST SERPL-MCNC: 209 MG/DL
CO2 SERPL-SCNC: 30 MMOL/L (ref 22–31)
CREAT SERPL-MCNC: 1.37 MG/DL (ref 0.6–1.1)
GFR SERPL CREATININE-BSD FRML MDRD: 42 ML/MIN/1.73M2
GLUCOSE BLD-MCNC: 96 MG/DL (ref 70–125)
HDLC SERPL-MCNC: 80 MG/DL
LDLC SERPL CALC-MCNC: 111 MG/DL
POTASSIUM BLD-SCNC: 4.4 MMOL/L (ref 3.5–5)
SODIUM SERPL-SCNC: 141 MMOL/L (ref 136–145)
TRIGL SERPL-MCNC: 88 MG/DL

## 2022-05-25 PROCEDURE — 80048 BASIC METABOLIC PNL TOTAL CA: CPT | Performed by: FAMILY MEDICINE

## 2022-05-25 PROCEDURE — 80061 LIPID PANEL: CPT | Performed by: FAMILY MEDICINE

## 2022-09-28 ENCOUNTER — LAB REQUISITION (OUTPATIENT)
Dept: LAB | Facility: CLINIC | Age: 67
End: 2022-09-28
Payer: MEDICARE

## 2022-09-28 DIAGNOSIS — I12.9 HYPERTENSIVE CHRONIC KIDNEY DISEASE WITH STAGE 1 THROUGH STAGE 4 CHRONIC KIDNEY DISEASE, OR UNSPECIFIED CHRONIC KIDNEY DISEASE: ICD-10-CM

## 2022-09-28 LAB
ANION GAP SERPL CALCULATED.3IONS-SCNC: 17 MMOL/L (ref 7–15)
BUN SERPL-MCNC: 26.8 MG/DL (ref 8–23)
CALCIUM SERPL-MCNC: 9.9 MG/DL (ref 8.8–10.2)
CHLORIDE SERPL-SCNC: 98 MMOL/L (ref 98–107)
CREAT SERPL-MCNC: 1.27 MG/DL (ref 0.51–0.95)
DEPRECATED HCO3 PLAS-SCNC: 25 MMOL/L (ref 22–29)
GFR SERPL CREATININE-BSD FRML MDRD: 46 ML/MIN/1.73M2
GLUCOSE SERPL-MCNC: 105 MG/DL (ref 70–99)
POTASSIUM SERPL-SCNC: 4 MMOL/L (ref 3.4–5.3)
SODIUM SERPL-SCNC: 140 MMOL/L (ref 136–145)

## 2022-09-28 PROCEDURE — 80048 BASIC METABOLIC PNL TOTAL CA: CPT | Mod: ORL | Performed by: FAMILY MEDICINE

## 2023-03-23 ENCOUNTER — LAB REQUISITION (OUTPATIENT)
Dept: LAB | Facility: CLINIC | Age: 68
End: 2023-03-23
Payer: MEDICARE

## 2023-03-23 DIAGNOSIS — N18.31 CHRONIC KIDNEY DISEASE, STAGE 3A (H): ICD-10-CM

## 2023-03-23 DIAGNOSIS — E78.5 HYPERLIPIDEMIA, UNSPECIFIED: ICD-10-CM

## 2023-03-23 PROCEDURE — 80048 BASIC METABOLIC PNL TOTAL CA: CPT | Mod: ORL | Performed by: FAMILY MEDICINE

## 2023-03-23 PROCEDURE — 80061 LIPID PANEL: CPT | Mod: ORL | Performed by: FAMILY MEDICINE

## 2023-03-24 LAB
ANION GAP SERPL CALCULATED.3IONS-SCNC: 15 MMOL/L (ref 7–15)
BUN SERPL-MCNC: 26.3 MG/DL (ref 8–23)
CALCIUM SERPL-MCNC: 9.2 MG/DL (ref 8.8–10.2)
CHLORIDE SERPL-SCNC: 97 MMOL/L (ref 98–107)
CHOLEST SERPL-MCNC: 208 MG/DL
CREAT SERPL-MCNC: 1.32 MG/DL (ref 0.51–0.95)
DEPRECATED HCO3 PLAS-SCNC: 26 MMOL/L (ref 22–29)
GFR SERPL CREATININE-BSD FRML MDRD: 44 ML/MIN/1.73M2
GLUCOSE SERPL-MCNC: 111 MG/DL (ref 70–99)
HDLC SERPL-MCNC: 88 MG/DL
LDLC SERPL CALC-MCNC: 101 MG/DL
NONHDLC SERPL-MCNC: 120 MG/DL
POTASSIUM SERPL-SCNC: 3.9 MMOL/L (ref 3.4–5.3)
SODIUM SERPL-SCNC: 138 MMOL/L (ref 136–145)
TRIGL SERPL-MCNC: 94 MG/DL

## 2023-05-02 ENCOUNTER — LAB REQUISITION (OUTPATIENT)
Dept: LAB | Facility: CLINIC | Age: 68
End: 2023-05-02
Payer: MEDICARE

## 2023-05-02 DIAGNOSIS — N18.32 CHRONIC KIDNEY DISEASE, STAGE 3B (H): ICD-10-CM

## 2023-05-02 LAB
ANION GAP SERPL CALCULATED.3IONS-SCNC: 15 MMOL/L (ref 7–15)
BUN SERPL-MCNC: 28.6 MG/DL (ref 8–23)
CALCIUM SERPL-MCNC: 9.4 MG/DL (ref 8.8–10.2)
CHLORIDE SERPL-SCNC: 102 MMOL/L (ref 98–107)
CREAT SERPL-MCNC: 1.19 MG/DL (ref 0.51–0.95)
DEPRECATED HCO3 PLAS-SCNC: 24 MMOL/L (ref 22–29)
GFR SERPL CREATININE-BSD FRML MDRD: 50 ML/MIN/1.73M2
GLUCOSE SERPL-MCNC: 110 MG/DL (ref 70–99)
POTASSIUM SERPL-SCNC: 4.1 MMOL/L (ref 3.4–5.3)
SODIUM SERPL-SCNC: 141 MMOL/L (ref 136–145)

## 2023-05-02 PROCEDURE — 80048 BASIC METABOLIC PNL TOTAL CA: CPT | Mod: ORL | Performed by: PHYSICIAN ASSISTANT

## 2023-09-11 ENCOUNTER — LAB REQUISITION (OUTPATIENT)
Dept: LAB | Facility: CLINIC | Age: 68
End: 2023-09-11
Payer: MEDICARE

## 2023-09-11 DIAGNOSIS — M79.7 FIBROMYALGIA: ICD-10-CM

## 2023-09-11 LAB — METHADONE UR QL SCN: NORMAL

## 2023-09-11 PROCEDURE — 80307 DRUG TEST PRSMV CHEM ANLYZR: CPT | Mod: ORL | Performed by: PHYSICIAN ASSISTANT

## 2023-12-19 ENCOUNTER — MEDICAL CORRESPONDENCE (OUTPATIENT)
Dept: SCHEDULING | Facility: CLINIC | Age: 68
End: 2023-12-19
Payer: MEDICARE

## 2023-12-28 ENCOUNTER — HOSPITAL ENCOUNTER (OUTPATIENT)
Dept: MAMMOGRAPHY | Facility: CLINIC | Age: 68
Discharge: HOME | End: 2023-12-28
Attending: NURSE PRACTITIONER | Admitting: NURSE PRACTITIONER
Payer: MEDICARE

## 2023-12-28 DIAGNOSIS — Z12.31 SCREENING MAMMOGRAM, ENCOUNTER FOR: ICD-10-CM

## 2023-12-28 PROCEDURE — 77067 SCR MAMMO BI INCL CAD: CPT

## 2024-03-11 ENCOUNTER — LAB REQUISITION (OUTPATIENT)
Dept: LAB | Facility: CLINIC | Age: 69
End: 2024-03-11
Payer: MEDICARE

## 2024-03-11 DIAGNOSIS — E78.5 HYPERLIPIDEMIA, UNSPECIFIED: ICD-10-CM

## 2024-03-11 LAB
ALBUMIN SERPL BCG-MCNC: 4.2 G/DL (ref 3.5–5.2)
ALP SERPL-CCNC: 74 U/L (ref 40–150)
ALT SERPL W P-5'-P-CCNC: 31 U/L (ref 0–50)
ANION GAP SERPL CALCULATED.3IONS-SCNC: 13 MMOL/L (ref 7–15)
AST SERPL W P-5'-P-CCNC: 30 U/L (ref 0–45)
BILIRUB SERPL-MCNC: 0.4 MG/DL
BUN SERPL-MCNC: 41 MG/DL (ref 8–23)
CALCIUM SERPL-MCNC: 9.6 MG/DL (ref 8.8–10.2)
CHLORIDE SERPL-SCNC: 97 MMOL/L (ref 98–107)
CHOLEST SERPL-MCNC: 193 MG/DL
CREAT SERPL-MCNC: 1.62 MG/DL (ref 0.51–0.95)
DEPRECATED HCO3 PLAS-SCNC: 28 MMOL/L (ref 22–29)
EGFRCR SERPLBLD CKD-EPI 2021: 34 ML/MIN/1.73M2
FASTING STATUS PATIENT QL REPORTED: NORMAL
GLUCOSE SERPL-MCNC: 106 MG/DL (ref 70–99)
HDLC SERPL-MCNC: 77 MG/DL
LDLC SERPL CALC-MCNC: 99 MG/DL
NONHDLC SERPL-MCNC: 116 MG/DL
POTASSIUM SERPL-SCNC: 4.2 MMOL/L (ref 3.4–5.3)
PROT SERPL-MCNC: 7 G/DL (ref 6.4–8.3)
SODIUM SERPL-SCNC: 138 MMOL/L (ref 135–145)
TRIGL SERPL-MCNC: 86 MG/DL

## 2024-03-11 PROCEDURE — 80053 COMPREHEN METABOLIC PANEL: CPT | Mod: ORL | Performed by: STUDENT IN AN ORGANIZED HEALTH CARE EDUCATION/TRAINING PROGRAM

## 2024-03-11 PROCEDURE — 80061 LIPID PANEL: CPT | Mod: ORL | Performed by: STUDENT IN AN ORGANIZED HEALTH CARE EDUCATION/TRAINING PROGRAM

## 2025-01-02 ENCOUNTER — LAB REQUISITION (OUTPATIENT)
Dept: LAB | Facility: CLINIC | Age: 70
End: 2025-01-02
Payer: MEDICARE

## 2025-01-02 DIAGNOSIS — N18.32 CHRONIC KIDNEY DISEASE, STAGE 3B (H): ICD-10-CM

## 2025-01-02 LAB
ANION GAP SERPL CALCULATED.3IONS-SCNC: 12 MMOL/L (ref 7–15)
BUN SERPL-MCNC: 26.1 MG/DL (ref 8–23)
CALCIUM SERPL-MCNC: 9.5 MG/DL (ref 8.8–10.4)
CHLORIDE SERPL-SCNC: 100 MMOL/L (ref 98–107)
CREAT SERPL-MCNC: 1.41 MG/DL (ref 0.51–0.95)
EGFRCR SERPLBLD CKD-EPI 2021: 40 ML/MIN/1.73M2
GLUCOSE SERPL-MCNC: 98 MG/DL (ref 70–99)
HCO3 SERPL-SCNC: 27 MMOL/L (ref 22–29)
POTASSIUM SERPL-SCNC: 4.7 MMOL/L (ref 3.4–5.3)
SODIUM SERPL-SCNC: 139 MMOL/L (ref 135–145)

## 2025-01-02 PROCEDURE — 80048 BASIC METABOLIC PNL TOTAL CA: CPT | Mod: ORL | Performed by: STUDENT IN AN ORGANIZED HEALTH CARE EDUCATION/TRAINING PROGRAM

## 2025-07-02 ENCOUNTER — LAB REQUISITION (OUTPATIENT)
Dept: LAB | Facility: CLINIC | Age: 70
End: 2025-07-02
Payer: MEDICARE

## 2025-07-02 DIAGNOSIS — E78.5 HYPERLIPIDEMIA, UNSPECIFIED: ICD-10-CM

## 2025-07-02 DIAGNOSIS — N18.32 CHRONIC KIDNEY DISEASE, STAGE 3B (H): ICD-10-CM

## 2025-07-02 LAB
ALBUMIN SERPL BCG-MCNC: 4.1 G/DL (ref 3.5–5.2)
ALP SERPL-CCNC: 61 U/L (ref 40–150)
ALT SERPL W P-5'-P-CCNC: 20 U/L (ref 0–50)
ANION GAP SERPL CALCULATED.3IONS-SCNC: 13 MMOL/L (ref 7–15)
AST SERPL W P-5'-P-CCNC: 29 U/L (ref 0–45)
BILIRUB SERPL-MCNC: 0.6 MG/DL
BUN SERPL-MCNC: 25.7 MG/DL (ref 8–23)
CALCIUM SERPL-MCNC: 9.8 MG/DL (ref 8.8–10.4)
CHLORIDE SERPL-SCNC: 100 MMOL/L (ref 98–107)
CHOLEST SERPL-MCNC: 249 MG/DL
CREAT SERPL-MCNC: 1.41 MG/DL (ref 0.51–0.95)
EGFRCR SERPLBLD CKD-EPI 2021: 40 ML/MIN/1.73M2
FASTING STATUS PATIENT QL REPORTED: NO
FASTING STATUS PATIENT QL REPORTED: NO
GLUCOSE SERPL-MCNC: 101 MG/DL (ref 70–99)
HCO3 SERPL-SCNC: 25 MMOL/L (ref 22–29)
HDLC SERPL-MCNC: 83 MG/DL
LDLC SERPL CALC-MCNC: 150 MG/DL
NONHDLC SERPL-MCNC: 166 MG/DL
POTASSIUM SERPL-SCNC: 3.9 MMOL/L (ref 3.4–5.3)
PROT SERPL-MCNC: 6.9 G/DL (ref 6.4–8.3)
SODIUM SERPL-SCNC: 138 MMOL/L (ref 135–145)
TRIGL SERPL-MCNC: 82 MG/DL

## 2025-07-02 PROCEDURE — 80061 LIPID PANEL: CPT | Mod: ORL | Performed by: STUDENT IN AN ORGANIZED HEALTH CARE EDUCATION/TRAINING PROGRAM

## 2025-07-02 PROCEDURE — 80053 COMPREHEN METABOLIC PANEL: CPT | Mod: ORL | Performed by: STUDENT IN AN ORGANIZED HEALTH CARE EDUCATION/TRAINING PROGRAM
